# Patient Record
Sex: MALE | Race: WHITE | NOT HISPANIC OR LATINO
[De-identification: names, ages, dates, MRNs, and addresses within clinical notes are randomized per-mention and may not be internally consistent; named-entity substitution may affect disease eponyms.]

---

## 2017-02-13 ENCOUNTER — APPOINTMENT (OUTPATIENT)
Dept: PULMONOLOGY | Facility: CLINIC | Age: 77
End: 2017-02-13

## 2017-02-28 ENCOUNTER — APPOINTMENT (OUTPATIENT)
Dept: VASCULAR SURGERY | Facility: CLINIC | Age: 77
End: 2017-02-28

## 2017-04-19 ENCOUNTER — APPOINTMENT (OUTPATIENT)
Dept: VASCULAR SURGERY | Facility: CLINIC | Age: 77
End: 2017-04-19

## 2017-04-19 RX ORDER — FLUTICASONE PROPIONATE 50 UG/1
50 SPRAY, METERED NASAL
Qty: 16 | Refills: 0 | Status: ACTIVE | COMMUNITY
Start: 2017-04-13

## 2017-05-12 ENCOUNTER — MEDICATION RENEWAL (OUTPATIENT)
Age: 77
End: 2017-05-12

## 2017-05-12 ENCOUNTER — APPOINTMENT (OUTPATIENT)
Dept: PULMONOLOGY | Facility: CLINIC | Age: 77
End: 2017-05-12

## 2017-05-12 VITALS
RESPIRATION RATE: 14 BRPM | HEART RATE: 81 BPM | BODY MASS INDEX: 36.51 KG/M2 | OXYGEN SATURATION: 94 % | HEIGHT: 70 IN | DIASTOLIC BLOOD PRESSURE: 80 MMHG | WEIGHT: 255 LBS | SYSTOLIC BLOOD PRESSURE: 138 MMHG

## 2017-08-23 ENCOUNTER — OUTPATIENT (OUTPATIENT)
Dept: OUTPATIENT SERVICES | Facility: HOSPITAL | Age: 77
LOS: 1 days | End: 2017-08-23
Payer: MEDICARE

## 2017-08-23 ENCOUNTER — APPOINTMENT (OUTPATIENT)
Dept: CT IMAGING | Facility: IMAGING CENTER | Age: 77
End: 2017-08-23
Payer: MEDICARE

## 2017-08-23 DIAGNOSIS — Z00.8 ENCOUNTER FOR OTHER GENERAL EXAMINATION: ICD-10-CM

## 2017-08-23 PROCEDURE — 71250 CT THORAX DX C-: CPT | Mod: 26

## 2017-08-23 PROCEDURE — 71250 CT THORAX DX C-: CPT

## 2017-10-18 ENCOUNTER — APPOINTMENT (OUTPATIENT)
Dept: ELECTROPHYSIOLOGY | Facility: CLINIC | Age: 77
End: 2017-10-18
Payer: MEDICARE

## 2017-10-18 VITALS
DIASTOLIC BLOOD PRESSURE: 98 MMHG | OXYGEN SATURATION: 97 % | BODY MASS INDEX: 37.94 KG/M2 | HEIGHT: 70 IN | WEIGHT: 265 LBS | SYSTOLIC BLOOD PRESSURE: 166 MMHG | HEART RATE: 73 BPM

## 2017-10-18 PROCEDURE — 93000 ELECTROCARDIOGRAM COMPLETE: CPT

## 2017-10-18 PROCEDURE — 99204 OFFICE O/P NEW MOD 45 MIN: CPT

## 2017-10-24 ENCOUNTER — APPOINTMENT (OUTPATIENT)
Dept: VASCULAR SURGERY | Facility: CLINIC | Age: 77
End: 2017-10-24
Payer: MEDICARE

## 2017-10-24 VITALS
TEMPERATURE: 99.6 F | HEIGHT: 70 IN | BODY MASS INDEX: 37.22 KG/M2 | WEIGHT: 260 LBS | DIASTOLIC BLOOD PRESSURE: 90 MMHG | SYSTOLIC BLOOD PRESSURE: 136 MMHG | HEART RATE: 75 BPM

## 2017-10-24 DIAGNOSIS — I71.4 ABDOMINAL AORTIC ANEURYSM, W/OUT RUPTURE: ICD-10-CM

## 2017-10-24 PROCEDURE — 93926 LOWER EXTREMITY STUDY: CPT | Mod: RT

## 2017-10-24 PROCEDURE — 99213 OFFICE O/P EST LOW 20 MIN: CPT | Mod: 25

## 2017-10-24 PROCEDURE — 93978 VASCULAR STUDY: CPT

## 2017-11-04 ENCOUNTER — EMERGENCY (EMERGENCY)
Facility: HOSPITAL | Age: 77
LOS: 1 days | End: 2017-11-04
Attending: EMERGENCY MEDICINE | Admitting: EMERGENCY MEDICINE
Payer: MEDICARE

## 2017-11-04 VITALS
OXYGEN SATURATION: 97 % | WEIGHT: 259.93 LBS | HEART RATE: 82 BPM | TEMPERATURE: 99 F | RESPIRATION RATE: 18 BRPM | SYSTOLIC BLOOD PRESSURE: 215 MMHG | DIASTOLIC BLOOD PRESSURE: 86 MMHG

## 2017-11-04 LAB
ALBUMIN SERPL ELPH-MCNC: 4.7 G/DL — SIGNIFICANT CHANGE UP (ref 3.3–5)
ALP SERPL-CCNC: 54 U/L — SIGNIFICANT CHANGE UP (ref 40–120)
ALT FLD-CCNC: 46 U/L RC — HIGH (ref 10–45)
ANION GAP SERPL CALC-SCNC: 16 MMOL/L — SIGNIFICANT CHANGE UP (ref 5–17)
APTT BLD: 31.8 SEC — SIGNIFICANT CHANGE UP (ref 27.5–37.4)
AST SERPL-CCNC: 25 U/L — SIGNIFICANT CHANGE UP (ref 10–40)
BASOPHILS # BLD AUTO: 0.1 K/UL — SIGNIFICANT CHANGE UP (ref 0–0.2)
BASOPHILS NFR BLD AUTO: 0.8 % — SIGNIFICANT CHANGE UP (ref 0–2)
BILIRUB SERPL-MCNC: 0.4 MG/DL — SIGNIFICANT CHANGE UP (ref 0.2–1.2)
BLD GP AB SCN SERPL QL: NEGATIVE — SIGNIFICANT CHANGE UP
BUN SERPL-MCNC: 19 MG/DL — SIGNIFICANT CHANGE UP (ref 7–23)
CALCIUM SERPL-MCNC: 10.5 MG/DL — SIGNIFICANT CHANGE UP (ref 8.4–10.5)
CHLORIDE SERPL-SCNC: 99 MMOL/L — SIGNIFICANT CHANGE UP (ref 96–108)
CK MB BLD-MCNC: 4.1 % — HIGH (ref 0–3.5)
CK MB CFR SERPL CALC: 7.7 NG/ML — HIGH (ref 0–6.7)
CK SERPL-CCNC: 190 U/L — SIGNIFICANT CHANGE UP (ref 30–200)
CO2 SERPL-SCNC: 26 MMOL/L — SIGNIFICANT CHANGE UP (ref 22–31)
CREAT SERPL-MCNC: 1.12 MG/DL — SIGNIFICANT CHANGE UP (ref 0.5–1.3)
EOSINOPHIL # BLD AUTO: 0.2 K/UL — SIGNIFICANT CHANGE UP (ref 0–0.5)
EOSINOPHIL NFR BLD AUTO: 2.9 % — SIGNIFICANT CHANGE UP (ref 0–6)
GLUCOSE SERPL-MCNC: 146 MG/DL — HIGH (ref 70–99)
HCT VFR BLD CALC: 48.4 % — SIGNIFICANT CHANGE UP (ref 39–50)
HGB BLD-MCNC: 16.3 G/DL — SIGNIFICANT CHANGE UP (ref 13–17)
INR BLD: 0.98 RATIO — SIGNIFICANT CHANGE UP (ref 0.88–1.16)
LYMPHOCYTES # BLD AUTO: 2.5 K/UL — SIGNIFICANT CHANGE UP (ref 1–3.3)
LYMPHOCYTES # BLD AUTO: 30.5 % — SIGNIFICANT CHANGE UP (ref 13–44)
MCHC RBC-ENTMCNC: 29.1 PG — SIGNIFICANT CHANGE UP (ref 27–34)
MCHC RBC-ENTMCNC: 33.6 GM/DL — SIGNIFICANT CHANGE UP (ref 32–36)
MCV RBC AUTO: 86.5 FL — SIGNIFICANT CHANGE UP (ref 80–100)
MONOCYTES # BLD AUTO: 0.5 K/UL — SIGNIFICANT CHANGE UP (ref 0–0.9)
MONOCYTES NFR BLD AUTO: 6.4 % — SIGNIFICANT CHANGE UP (ref 2–14)
NEUTROPHILS # BLD AUTO: 4.8 K/UL — SIGNIFICANT CHANGE UP (ref 1.8–7.4)
NEUTROPHILS NFR BLD AUTO: 59.3 % — SIGNIFICANT CHANGE UP (ref 43–77)
PLATELET # BLD AUTO: 158 K/UL — SIGNIFICANT CHANGE UP (ref 150–400)
POTASSIUM SERPL-MCNC: 4.4 MMOL/L — SIGNIFICANT CHANGE UP (ref 3.5–5.3)
POTASSIUM SERPL-SCNC: 4.4 MMOL/L — SIGNIFICANT CHANGE UP (ref 3.5–5.3)
PROT SERPL-MCNC: 7.9 G/DL — SIGNIFICANT CHANGE UP (ref 6–8.3)
PROTHROM AB SERPL-ACNC: 10.7 SEC — SIGNIFICANT CHANGE UP (ref 9.8–12.7)
RBC # BLD: 5.6 M/UL — SIGNIFICANT CHANGE UP (ref 4.2–5.8)
RBC # FLD: 15.1 % — HIGH (ref 10.3–14.5)
RH IG SCN BLD-IMP: POSITIVE — SIGNIFICANT CHANGE UP
SODIUM SERPL-SCNC: 141 MMOL/L — SIGNIFICANT CHANGE UP (ref 135–145)
TROPONIN T SERPL-MCNC: 0.03 NG/ML — SIGNIFICANT CHANGE UP (ref 0–0.06)
WBC # BLD: 8.1 K/UL — SIGNIFICANT CHANGE UP (ref 3.8–10.5)
WBC # FLD AUTO: 8.1 K/UL — SIGNIFICANT CHANGE UP (ref 3.8–10.5)

## 2017-11-04 PROCEDURE — 71010: CPT | Mod: 26

## 2017-11-04 PROCEDURE — 93010 ELECTROCARDIOGRAM REPORT: CPT

## 2017-11-04 PROCEDURE — 70450 CT HEAD/BRAIN W/O DYE: CPT | Mod: 26

## 2017-11-04 PROCEDURE — 99220: CPT | Mod: 25

## 2017-11-04 RX ORDER — ASPIRIN/CALCIUM CARB/MAGNESIUM 324 MG
81 TABLET ORAL DAILY
Qty: 0 | Refills: 0 | Status: DISCONTINUED | OUTPATIENT
Start: 2017-11-04 | End: 2017-11-11

## 2017-11-04 RX ORDER — ACYCLOVIR SODIUM 500 MG
800 VIAL (EA) INTRAVENOUS ONCE
Qty: 0 | Refills: 0 | Status: COMPLETED | OUTPATIENT
Start: 2017-11-04 | End: 2017-11-04

## 2017-11-04 RX ORDER — ATORVASTATIN CALCIUM 80 MG/1
80 TABLET, FILM COATED ORAL AT BEDTIME
Qty: 0 | Refills: 0 | Status: DISCONTINUED | OUTPATIENT
Start: 2017-11-04 | End: 2017-11-11

## 2017-11-04 RX ORDER — FOLIC ACID 0.8 MG
1 TABLET ORAL DAILY
Qty: 0 | Refills: 0 | Status: DISCONTINUED | OUTPATIENT
Start: 2017-11-04 | End: 2017-11-11

## 2017-11-04 RX ORDER — CILOSTAZOL 100 MG/1
50 TABLET ORAL
Qty: 0 | Refills: 0 | Status: DISCONTINUED | OUTPATIENT
Start: 2017-11-04 | End: 2017-11-11

## 2017-11-04 RX ORDER — SODIUM CHLORIDE 9 MG/ML
3 INJECTION INTRAMUSCULAR; INTRAVENOUS; SUBCUTANEOUS EVERY 8 HOURS
Qty: 0 | Refills: 0 | Status: DISCONTINUED | OUTPATIENT
Start: 2017-11-04 | End: 2017-11-11

## 2017-11-04 RX ORDER — CLOPIDOGREL BISULFATE 75 MG/1
75 TABLET, FILM COATED ORAL DAILY
Qty: 0 | Refills: 0 | Status: DISCONTINUED | OUTPATIENT
Start: 2017-11-04 | End: 2017-11-11

## 2017-11-04 RX ORDER — ASPIRIN/CALCIUM CARB/MAGNESIUM 324 MG
81 TABLET ORAL ONCE
Qty: 0 | Refills: 0 | Status: DISCONTINUED | OUTPATIENT
Start: 2017-11-04 | End: 2017-11-04

## 2017-11-04 RX ORDER — SODIUM CHLORIDE 0.65 %
1 AEROSOL, SPRAY (ML) NASAL EVERY 6 HOURS
Qty: 0 | Refills: 0 | Status: DISCONTINUED | OUTPATIENT
Start: 2017-11-04 | End: 2017-11-11

## 2017-11-04 RX ORDER — METOPROLOL TARTRATE 50 MG
50 TABLET ORAL
Qty: 0 | Refills: 0 | Status: DISCONTINUED | OUTPATIENT
Start: 2017-11-04 | End: 2017-11-11

## 2017-11-04 RX ADMIN — Medication 800 MILLIGRAM(S): at 20:31

## 2017-11-04 NOTE — ED CDU PROVIDER DISPOSITION NOTE - CLINICAL COURSE
78y/o male with past medical history HTN, hyperlipidemia, Coronary Artery Disease with stents and CABG x3, COPD, HLD, PVD, Bell's Palsy in the past, presents to the ED for slurred speech and facial droop since 1pm. Pt was noted to have slurred speech and right sided facial droop by family. Pt and family state symptoms are slightly improved from before. Pt states he had the same symptoms last time he had bells palsy 4 years ago. Patient reports no ear pain, no change in taste, no change in hearing. He denied weakness, numbness, vision loss and difficulty with coordination. pt refused a/c and prednisone in ED, received acyclovir in ED. CT head negative, labs WNL, MRI brain __________. 78y/o male with past medical history HTN, hyperlipidemia, Coronary Artery Disease with stents and CABG x3, COPD, HLD, PVD, Bell's Palsy in the past, presents to the ED for slurred speech and facial droop since 1pm. Pt was noted to have slurred speech and right sided facial droop by family. Pt and family state symptoms are slightly improved from before. Pt states he had the same symptoms last time he had bells palsy 4 years ago. Patient reports no ear pain, no change in taste, no change in hearing. He denied weakness, numbness, vision loss and difficulty with coordination. pt refused a/c and prednisone in ED, received acyclovir in ED. CT head negative, labs WNL. Patient seen by Dr. Carballo, neurologist this morning, stating that he feels strongly patient has Bell's Palsy. Patient refused MRI; unable to tolerate despite offering medication. Dr. Carballo made aware and ok with it as patient will see him tomorrow in his office. Pt had 5 beats of VTach and Dr. Carballo and Dr. Doan made aware; pt's cardiologist Dr. Graff's answering service called. Pt had work-up for arrhythmia last week and continues to follow-up. Will see Dr. Carballo tomorrow, Dr. Graff this week, d/c with treatment for Bell's Palsy.

## 2017-11-04 NOTE — ED CDU PROVIDER INITIAL DAY NOTE - OBJECTIVE STATEMENT
76y/o male with past medical history HTN, hyperlipidemia, Coronary Artery Disease with stents and CABG x3, COPD, HLD, PVD, Bell's Palsy in the past, presents to the ED for slurred speech and facial droop since 1pm. Pt was noted to have slurred speech and right sided facial droop by family. Pt and family state symptoms are slightly improved from before. Pt states he had the same symptoms last time he had bells palsy 4 years ago. Patient reports no ear pain, no change in taste, no change in hearing. He denied weakness, numbness, vision loss and difficulty with coordination. pt refused a/c and prednisone in ED, received acyclovir in ED.

## 2017-11-04 NOTE — CONSULT NOTE ADULT - SUBJECTIVE AND OBJECTIVE BOX
HPI:    77 year old male past medical history HTN, hyperlipidemia, Coronary Artery Disease with stents and CABG x3, Bell's Palsy in the past, presents to the ED for slurred speech and facial droop.     Last know normal was between 12-1 pm,  when patient arrived to family member's house in [place]. He was noted to have slurred speech and right sided facial droop. Patient reports no ear pain, no change in taste, no change in hearing.     He denied weakness, numbness, vision loss and difficulty with coordination.     MEDICATIONS  (STANDING):  aspirin enteric coated 81 milliGRAM(s) Oral daily    MEDICATIONS  (PRN):    PAST MEDICAL & SURGICAL HISTORY:  COPD (chronic obstructive pulmonary disease)  PVD (Peripheral Vascular Disease)  Obese  Right Ankle Fracture  Hypercholesterolemia  HTN - Hypertension  Wound debridement 2010  Right Great saphenous vein bypass: trombectomy  ORIF Right ankle      FAMILY HISTORY:  Family history of hypertension  Family history of diabetes mellitus  Family history of heart disease: MI -  at 57      Allergies    No Known Allergies    Intolerances      SHx - No smoking, No ETOH, No drug abuse      Review of Systems:  CONSTITUTIONAL:  No weight loss, fever, chills, weakness or fatigue.  HEENT:  Eyes:  No visual loss, blurred vision, double vision or yellow sclerae. Ears, Nose, Throat:  No hearing loss, sneezing, congestion, runny nose or sore throat.  SKIN:  No rash or itching.  CARDIOVASCULAR:  No chest pain, chest pressure or chest discomfort. No palpitations or edema.  RESPIRATORY:  No shortness of breath, cough or sputum.  GASTROINTESTINAL:  No anorexia, nausea, vomiting or diarrhea. No abdominal pain or blood.  GENITOURINARY:  NO Burning on urination.   NEUROLOGICAL: See HPI  MUSCULOSKELETAL:  No muscle, back pain, joint pain or stiffness.  HEMATOLOGIC:  No anemia, bleeding or bruising.  LYMPHATICS:  No enlarged nodes. No history of splenectomy.  PSYCHIATRIC:  No history of depression or anxiety.  ENDOCRINOLOGIC:  No reports of sweating, cold or heat intolerance. No polyuria or polydipsia.  ALLERGIES:  No history of asthma, hives, eczema or rhinitis.        Vital Signs Last 24 Hrs  T(C): 37.1 (2017 19:20), Max: 37.2 (2017 19:12)  T(F): 98.7 (2017 19:20), Max: 98.9 (2017 19:12)  HR: 79 (2017 20:32) (79 - 90)  BP: 159/91 (2017 20:32) (159/91 - 215/86)  BP(mean): --  RR: 18 (2017 20:32) (18 - 20)  SpO2: 98% (2017 20:32) (97% - 100%)    General Exam:   General appearance: No acute distress                   Neurological Exam:    Mental Status: Orientated to self, date and place.  Attention intact.  No dysarthria, aphasia or neglect.  Cranial Nerves: PERRL, EOMI, CN V1-3 intact to light touch and pinprick. facial droop on right, less wrinkles seen on right forehead comapred to left and patient's taste altered on left side of tongue  , Tongue, uvula and palate midline.      Motor:   Tone: normal.                  Strength:     Upper extremity           Lower extremity                       HF          KE          KF        DF         PF                                               R        5/5        5/5        5/5       5/5       5/5                                               L         5/5        5/5       5/5       5/5        5/5  Pronator drift: none                 Dysmetria: None to finger-nose-finger or heel-shin-heel  No truncal ataxia.    Tremor: No resting, postural or action tremor.  No myoclonus.    Sensation: intact to light touch, pinprick    Deep Tendon Reflexes: 1+ bilateral biceps, triceps, brachioradialis, knee and ankle  Toes flexor bilaterally    Gait: normal and stable.      Other:        141  |  99  |  19  ----------------------------<  146<H>  4.4   |  26  |  1.12    Ca    10.5      2017 19:26    TPro  7.9  /  Alb  4.7  /  TBili  0.4  /  DBili  x   /  AST  25  /  ALT  46<H>  /  AlkPhos  54      141  |  99  |  19  ----------------------------<  146<H>  4.4   |  26  |  1.12    Ca    10.5      2017 19:26    TPro  7.9  /  Alb  4.7  /  TBili  0.4  /  DBili  x   /  AST  25  /  ALT  46<H>  /  AlkPhos  54  11-04                          16.3   8.1   )-----------( 158      ( 2017 19:26 )             48.4       Radiology    CT head : unremarkable

## 2017-11-04 NOTE — ED ADULT NURSE REASSESSMENT NOTE - COMFORT CARE
warm blanket provided/repositioned/wait time explained/plan of care explained/treatment delay explained

## 2017-11-04 NOTE — ED ADULT NURSE NOTE - PMH
COPD (chronic obstructive pulmonary disease)    HTN - Hypertension    Hypercholesterolemia    Obese    PVD (Peripheral Vascular Disease)    Right Ankle Fracture

## 2017-11-04 NOTE — ED CDU PROVIDER INITIAL DAY NOTE - MEDICAL DECISION MAKING DETAILS
77M hx multiple comorbidities presents with right sided facial droop suggestive of CVA v atypical bell's palsy.  CDU for MRI brain as per neurology recommendation, q4h neuro checks, telemonitoring.  If MRI negative continue course of prednisone and valtrex for suspected bell's palsy.

## 2017-11-04 NOTE — ED PROVIDER NOTE - ATTENDING CONTRIBUTION TO CARE
***Attending Jensen Hansen MD***  77M pmhx HTN, HLD, CAD, bell's palsy presents ***Attending Jensen Hansen MD***  77M pmhx HTN, HLD, CAD, bell's palsy presents with slurred speech and right-sided facial droop noted approx 6 hours prior to ED arrival.  No known trauma.  No other complaints.  Pt ambulatory.  Drove to [place] earlier today.  On ASA 81mg.  VSS.  Right facial droop with some involvement of R lids, sparing of forehead.  code stroke called.  CVA v atypical Bell's palsy

## 2017-11-04 NOTE — ED ADULT NURSE REASSESSMENT NOTE - NIH STROKE SCALE: 4. FACIAL PALSY, QM
(1) Minor paralysis (flattened nasolabial fold, asymmetry on smiling)
(1) Minor paralysis (flattened nasolabial fold, asymmetry on smiling)

## 2017-11-04 NOTE — ED ADULT NURSE NOTE - OBJECTIVE STATEMENT
78 yo male pt with history of HTN, HLD, CAD with stents and cabg x 3, bell's palsy in the past presents to ed complaining of slurred speech. as per pt's family pt started having slurred speech and right sided facial droop today at 1:00PM. pt denies headache/pain of any kind/diaphoresis/chest pain/n/v/numbness/tingling/blurry vision/weakness besides face. pupils equal round and reactive to light. raphael with 5/5 strength. no motor or sensory loss noted. pt states "my face just feels weak". CODE STROKE CALLED AT 1916. right sided facial droop noted. no leg or arm drift noted. aox4, follows commands. breath sounds clear and equal bilaterally. skin warm dry and intact. pt denies falls/trauma/hitting head. safety maintained, family at bedside.

## 2017-11-04 NOTE — ED CDU PROVIDER INITIAL DAY NOTE - DETAILS
- frequent re-eval  - vitals q 4hrs  - tele  - neurochecks q 4hrs  - MRI head  - neuro following  - DC with valtrex 1gm TID for bells palsy   - case discussed with attending Dr. Hansen

## 2017-11-04 NOTE — ED ADULT NURSE NOTE - CHPI ED SYMPTOMS NEG
no loss of consciousness/no nausea/no vomiting/no numbness/no change in level of consciousness/no fever/no weakness/no blurred vision/no dizziness/no confusion

## 2017-11-04 NOTE — ED PROVIDER NOTE - MEDICAL DECISION MAKING DETAILS
77 year old male past medical history HTN, hyperlipidemia, Coronary Artery Disease with stents and CABG x3, Bell's Palsy in the past, presents to the ED for slurred speech. Code stroke called from triage. However likely Bell's palsy as he has some right upper facial movement loss. However, will continue with code stroke. Patient outside TPA window. Neurology evaluated patient at CT scan. Will obtain code stroke labs, CT, neuro recs, reeval.

## 2017-11-04 NOTE — CONSULT NOTE ADULT - ASSESSMENT
77 year old male past medical history HTN, hyperlipidemia, Coronary Artery Disease with stents and CABG x3, Bell's Palsy in the past, presents to the ED for slurred speech and facial droop. Last know normal was between 12-1 pm,  when patient arrived to family member's house in [place]. He was noted to have slurred speech and right sided facial droop. Patient reports no ear pain, no change in taste, no change in hearing. He denied weakness, numbness, vision loss and difficulty with coordination. Examination was concerning for right facial droop with less wrinkles on right forehead compare to left but on taste evaluation he had opposite result.     Impression    bells palsy vs stroke     Plan     Aspirin 81 mg   Atorvastatin 80 mg   Prednisone 50 mg 77 year old male past medical history HTN, hyperlipidemia, Coronary Artery Disease with stents and CABG x3, Bell's Palsy in the past, presents to the ED for slurred speech and facial droop. Last know normal was between 12-1 pm,  when patient arrived to family member's house in [place]. He was noted to have slurred speech and right sided facial droop. Patient reports no ear pain, no change in taste, no change in hearing. He denied weakness, numbness, vision loss and difficulty with coordination. Examination was concerning for right facial droop with less wrinkles on right forehead compare to left but on taste evaluation he had opposite result.     Impression    bells palsy vs stroke     Plan     Aspirin 81 mg   Atorvastatin 80 mg   Prednisone 50 mg     If MRI is negative for stroke, patient can get prednisone 50 mg for 5 days

## 2017-11-04 NOTE — ED CDU PROVIDER DISPOSITION NOTE - ATTENDING CONTRIBUTION TO CARE
CDU Attending Note -- Pt seen and examined at bedside.  Case discussed c CDU PA.  Pt comfortable, physical exam findings stable, has f/u c neurology tomorrow.  Aware of 5 beat NSVT, pt states he had w/u for same several weeks ago and found to be benign.  Strict return precautions provided, prednisone Rx'd to patient's pharmacy.  Stable for d/c.  --BMM

## 2017-11-04 NOTE — ED ADULT NURSE REASSESSMENT NOTE - NS ED NURSE REASSESS COMMENT FT1
neurological assessment and vital signs sheet filled out and placed in chart, see sheet for neuro checks. safety maintained. pt resting comfortably in bed. pt denies chest pain/headache/n/v/numbness/tingling. raphael with 5/5 strength. no motor or sensory loss noted. family at bedside. pa from cdu at bedside.
pt resting comfortably in bed. aox4, follows commands. pt denies numbness/tingling/n/v/diaphoresis/chest pain/sob/fever/chills/abd pain/headache/dizziness/weakness at this time. safety maintained. will continue to monitor.
Pt report received from Nasrin MORALES. Pt transferred to cdu for right sided facial droop. Pt a/ox3 denies respiratory distress, sob, dyspnea, C/P, palpitations, n/v/d at this time. Pt neuro intact, pt states no changes in vision, dizziness, or headache. Awaiting MRI . VSS, afebrile, IV clean/dry/intact. Pt aware of plan of care, call bell within reach ,safety maintained. Will monitor and assess.

## 2017-11-04 NOTE — ED CDU PROVIDER DISPOSITION NOTE - PLAN OF CARE
1. Continue your home medications as directed. Take Valtrex 1gm 3 times a day.   2. Drink plenty of fluids to stay hydrated.  3. You will need to follow up with your PMD and neurologist or our neurology clinic at [phone] in 2-3 days. A copy of your results were given to you to bring to your appt.   4. Return to ER for headache, confusion, behavior/speech changes, numbness/tingling/weakness in your arms/legs, or any other concerns. 1. Continue your home medications as directed. Take Valtrex 1gm 3 times a day. Take Prednisone 60mg once daily for 7 days.  2. Drink plenty of fluids to stay hydrated.  3. You will need to follow up with your PMD/cardiologist Dr. Graff this week for reevaluation. You need to call Dr. Carballo tomorrow morning to establish care with him and see him in his office tomorrow at 1pm. #[phone].   4. Return to ER for headache, confusion, behavior/speech changes, numbness/tingling/weakness in your arms/legs, or any other concerns.

## 2017-11-04 NOTE — ED PROVIDER NOTE - FAMILY HISTORY
Father  Still living? Unknown  Family history of heart disease, Age at diagnosis: Age Unknown  Family history of hypertension, Age at diagnosis: Age Unknown     Mother  Still living? Unknown  Family history of hypertension, Age at diagnosis: Age Unknown

## 2017-11-04 NOTE — CONSULT NOTE ADULT - ATTENDING COMMENTS
The patient has been seen and evaluated.  The patient's exam reveals a LMN facial droop on the right.  There is no evidence of dysconjugate gaze, tongue deviation, sensory change, pupillary abnormalities, weakness or paresthesias.  This likely represents a right Bell's Palsy, however given that this  is the patient's second episode I would recommend serology for sarcoidosis and lyme disease (ACE/Lyme Ig Index).  The patient was scheduled for MRI Brain prior to discharge however is refusing the examination.  I would recommend that the patient be treated with Prednisone 60mg Q24h for 10 days for right peripheral facial nerve dysfunction.  Close follow up in my office and if there is any worsening of symptoms he should return to the ED as soon as possible. C/W ASA and Statin

## 2017-11-05 VITALS
TEMPERATURE: 98 F | DIASTOLIC BLOOD PRESSURE: 84 MMHG | RESPIRATION RATE: 16 BRPM | HEART RATE: 85 BPM | OXYGEN SATURATION: 95 % | SYSTOLIC BLOOD PRESSURE: 154 MMHG

## 2017-11-05 LAB
CHOLEST SERPL-MCNC: 135 MG/DL — SIGNIFICANT CHANGE UP (ref 10–199)
HBA1C BLD-MCNC: 6.2 % — HIGH (ref 4–5.6)
HDLC SERPL-MCNC: 24 MG/DL — LOW (ref 40–125)
LIPID PNL WITH DIRECT LDL SERPL: SIGNIFICANT CHANGE UP
TOTAL CHOLESTEROL/HDL RATIO MEASUREMENT: 5.6 RATIO — SIGNIFICANT CHANGE UP (ref 3.4–9.6)
TRIGL SERPL-MCNC: 419 MG/DL — HIGH (ref 10–149)

## 2017-11-05 PROCEDURE — 85610 PROTHROMBIN TIME: CPT

## 2017-11-05 PROCEDURE — 71045 X-RAY EXAM CHEST 1 VIEW: CPT

## 2017-11-05 PROCEDURE — 86850 RBC ANTIBODY SCREEN: CPT

## 2017-11-05 PROCEDURE — 86900 BLOOD TYPING SEROLOGIC ABO: CPT

## 2017-11-05 PROCEDURE — 83036 HEMOGLOBIN GLYCOSYLATED A1C: CPT

## 2017-11-05 PROCEDURE — 99217: CPT | Mod: 25

## 2017-11-05 PROCEDURE — G0378: CPT

## 2017-11-05 PROCEDURE — 85730 THROMBOPLASTIN TIME PARTIAL: CPT

## 2017-11-05 PROCEDURE — 82962 GLUCOSE BLOOD TEST: CPT

## 2017-11-05 PROCEDURE — 80061 LIPID PANEL: CPT

## 2017-11-05 PROCEDURE — 70450 CT HEAD/BRAIN W/O DYE: CPT

## 2017-11-05 PROCEDURE — 86901 BLOOD TYPING SEROLOGIC RH(D): CPT

## 2017-11-05 PROCEDURE — 93005 ELECTROCARDIOGRAM TRACING: CPT

## 2017-11-05 PROCEDURE — 82553 CREATINE MB FRACTION: CPT

## 2017-11-05 PROCEDURE — 82550 ASSAY OF CK (CPK): CPT

## 2017-11-05 PROCEDURE — 80053 COMPREHEN METABOLIC PANEL: CPT

## 2017-11-05 PROCEDURE — 85027 COMPLETE CBC AUTOMATED: CPT

## 2017-11-05 PROCEDURE — 99284 EMERGENCY DEPT VISIT MOD MDM: CPT | Mod: 25

## 2017-11-05 PROCEDURE — 84484 ASSAY OF TROPONIN QUANT: CPT

## 2017-11-05 RX ORDER — VALACYCLOVIR 500 MG/1
1 TABLET, FILM COATED ORAL
Qty: 21 | Refills: 0
Start: 2017-11-05 | End: 2017-11-12

## 2017-11-05 RX ADMIN — CLOPIDOGREL BISULFATE 75 MILLIGRAM(S): 75 TABLET, FILM COATED ORAL at 09:18

## 2017-11-05 RX ADMIN — Medication 81 MILLIGRAM(S): at 09:18

## 2017-11-05 RX ADMIN — ATORVASTATIN CALCIUM 80 MILLIGRAM(S): 80 TABLET, FILM COATED ORAL at 00:07

## 2017-11-05 RX ADMIN — Medication 50 MILLIGRAM(S): at 00:07

## 2017-11-05 RX ADMIN — SODIUM CHLORIDE 3 MILLILITER(S): 9 INJECTION INTRAMUSCULAR; INTRAVENOUS; SUBCUTANEOUS at 06:33

## 2017-11-05 RX ADMIN — Medication 1 SPRAY(S): at 00:08

## 2017-11-05 RX ADMIN — CILOSTAZOL 50 MILLIGRAM(S): 100 TABLET ORAL at 06:52

## 2017-11-05 RX ADMIN — Medication 50 MILLIGRAM(S): at 09:18

## 2017-11-05 NOTE — ED CDU PROVIDER SUBSEQUENT DAY NOTE - PROGRESS NOTE DETAILS
CDU progress note YOLA Logan: Patient sleeping comfortably. NAD. VSS. no events on telemetry. CDU PROGRESS NOTE YOLA AZUL: Pt resting comfortably, feeling well without complaint. NAD, VSS. No events on telemetry. Dr. Carballo, neurologist, came to bedside to see and evaluate patient. Pending MRI at this time. Tele room called to say pt had 5 beats of Vtach. Pt was asymptomatic throughout this episode and denies any chest pain, lightheadedness, dizziness or other symptoms. VSS. Will discuss plan with Dr. Carballo and patient's PMD Dr. Graff when results are in. Pt refused MRI. Multiple attempts to discuss with patient the importance of having MRI to r/o stroke, offered patient medication to help make MRI tolerable including benzo for sedative, patient continues to adamantly refuse. Called Dr. Carballo to discuss stating that he feels strongly patient has a Bell's Palsy given CN VI palsy, LMN facial droop and no other discrete neurological deficits. Dr. Carballo states that he has very low suspicion this is ischemic, and is OK with patient discharged without MRI. States that he agrees with sending patient home with treatment for Bell's Palsy and will see patient tomorrow morning in his office. Attempt to contact patient's PMD Dr. Graff regarding run of PVCs, answering service said 'they spoke to him and he is not taking call until 11AM unable to discuss the patient at this time.' Advised service to please inform Dr. Graff of these results and that patient is refusing further testing asked them to call me when he is on call. As patient is refusing telemetry at this time and wishing to go home, prednisone and valtrex sent to patient's pharmacy and pending Dr. Doan evaluation for patient dispo. Attending Note -- Agree with above.  Please see ED provider/CDU discharge MDM as indicated.  --BMM

## 2017-11-05 NOTE — ED CDU PROVIDER SUBSEQUENT DAY NOTE - HISTORY
No interval changes since initial CDU provider note, pt waiting for MRI. Pt feels well without complaint. NAD VSS. no events on tele. Neuro exam: R facial droop slightly improved - visible lines on R side of forehead now with slight eyebrow movement, otherwise exam unchanged. - YOLA Logan

## 2017-11-06 NOTE — ED POST DISCHARGE NOTE - OTHER COMMUNICATION
spoke to pt in detail in regards to the lab work and change in diet, exercise and weight loss and follow up with PMD for repeat bloodwork. pt understands and will follow up -YOLA Gottlieb

## 2017-11-10 ENCOUNTER — APPOINTMENT (OUTPATIENT)
Dept: PULMONOLOGY | Facility: CLINIC | Age: 77
End: 2017-11-10

## 2018-01-16 ENCOUNTER — APPOINTMENT (OUTPATIENT)
Dept: VASCULAR SURGERY | Facility: CLINIC | Age: 78
End: 2018-01-16

## 2018-01-17 ENCOUNTER — APPOINTMENT (OUTPATIENT)
Dept: VASCULAR SURGERY | Facility: CLINIC | Age: 78
End: 2018-01-17

## 2018-02-28 ENCOUNTER — APPOINTMENT (OUTPATIENT)
Dept: VASCULAR SURGERY | Facility: CLINIC | Age: 78
End: 2018-02-28
Payer: MEDICARE

## 2018-02-28 VITALS
BODY MASS INDEX: 37.22 KG/M2 | HEIGHT: 70 IN | WEIGHT: 260 LBS | SYSTOLIC BLOOD PRESSURE: 148 MMHG | DIASTOLIC BLOOD PRESSURE: 79 MMHG | TEMPERATURE: 98.4 F | HEART RATE: 71 BPM

## 2018-02-28 PROCEDURE — 99214 OFFICE O/P EST MOD 30 MIN: CPT

## 2018-02-28 PROCEDURE — 93880 EXTRACRANIAL BILAT STUDY: CPT

## 2018-02-28 RX ORDER — VALACYCLOVIR 1 G/1
1 TABLET, FILM COATED ORAL
Qty: 21 | Refills: 0 | Status: ACTIVE | COMMUNITY
Start: 2017-11-05

## 2018-02-28 RX ORDER — ECONAZOLE NITRATE 10 MG/G
1 CREAM TOPICAL
Qty: 85 | Refills: 0 | Status: ACTIVE | COMMUNITY
Start: 2017-11-10

## 2018-04-09 NOTE — ED PROVIDER NOTE - PROGRESS NOTE DETAILS
Card mailed.  
Seen by Neuro unclear if Bell's or stroke, on their exam had ?glossopharyngeal dysfunction, will treat for bells and dispo to cdu for MRI

## 2018-05-25 ENCOUNTER — APPOINTMENT (OUTPATIENT)
Dept: VASCULAR SURGERY | Facility: CLINIC | Age: 78
End: 2018-05-25
Payer: MEDICARE

## 2018-05-25 VITALS
SYSTOLIC BLOOD PRESSURE: 138 MMHG | HEIGHT: 70 IN | TEMPERATURE: 98.3 F | WEIGHT: 260 LBS | HEART RATE: 75 BPM | DIASTOLIC BLOOD PRESSURE: 72 MMHG | BODY MASS INDEX: 37.22 KG/M2

## 2018-05-25 PROCEDURE — 99214 OFFICE O/P EST MOD 30 MIN: CPT

## 2018-05-25 PROCEDURE — 93926 LOWER EXTREMITY STUDY: CPT

## 2018-05-25 PROCEDURE — 93923 UPR/LXTR ART STDY 3+ LVLS: CPT

## 2018-05-25 RX ORDER — IBUPROFEN 600 MG/1
600 TABLET, FILM COATED ORAL
Qty: 15 | Refills: 0 | Status: ACTIVE | COMMUNITY
Start: 2018-05-21

## 2018-06-27 ENCOUNTER — APPOINTMENT (OUTPATIENT)
Dept: VASCULAR SURGERY | Facility: CLINIC | Age: 78
End: 2018-06-27
Payer: MEDICARE

## 2018-06-27 VITALS
HEART RATE: 74 BPM | DIASTOLIC BLOOD PRESSURE: 80 MMHG | TEMPERATURE: 98.3 F | WEIGHT: 260 LBS | BODY MASS INDEX: 37.22 KG/M2 | HEIGHT: 70 IN | SYSTOLIC BLOOD PRESSURE: 129 MMHG

## 2018-06-27 DIAGNOSIS — I77.811 ABDOMINAL AORTIC ECTASIA: ICD-10-CM

## 2018-06-27 PROCEDURE — 93978 VASCULAR STUDY: CPT

## 2018-06-27 PROCEDURE — 99213 OFFICE O/P EST LOW 20 MIN: CPT

## 2018-06-27 RX ORDER — SODIUM FLUORIDE 1.1 G/100G
1.1 GEL, DENTIFRICE ORAL
Qty: 51 | Refills: 0 | Status: ACTIVE | COMMUNITY
Start: 2018-06-06

## 2018-09-24 ENCOUNTER — RX RENEWAL (OUTPATIENT)
Age: 78
End: 2018-09-24

## 2018-10-08 ENCOUNTER — OUTPATIENT (OUTPATIENT)
Dept: OUTPATIENT SERVICES | Facility: HOSPITAL | Age: 78
LOS: 1 days | End: 2018-10-08
Payer: MEDICARE

## 2018-10-08 ENCOUNTER — APPOINTMENT (OUTPATIENT)
Dept: RADIOLOGY | Facility: IMAGING CENTER | Age: 78
End: 2018-10-08
Payer: MEDICARE

## 2018-10-08 DIAGNOSIS — Z00.8 ENCOUNTER FOR OTHER GENERAL EXAMINATION: ICD-10-CM

## 2018-10-08 PROCEDURE — 71046 X-RAY EXAM CHEST 2 VIEWS: CPT

## 2018-10-08 PROCEDURE — 71046 X-RAY EXAM CHEST 2 VIEWS: CPT | Mod: 26

## 2018-10-22 ENCOUNTER — RX RENEWAL (OUTPATIENT)
Age: 78
End: 2018-10-22

## 2018-10-24 ENCOUNTER — APPOINTMENT (OUTPATIENT)
Dept: VASCULAR SURGERY | Facility: CLINIC | Age: 78
End: 2018-10-24

## 2018-12-19 ENCOUNTER — APPOINTMENT (OUTPATIENT)
Dept: VASCULAR SURGERY | Facility: CLINIC | Age: 78
End: 2018-12-19

## 2019-05-28 ENCOUNTER — APPOINTMENT (OUTPATIENT)
Dept: VASCULAR SURGERY | Facility: CLINIC | Age: 79
End: 2019-05-28
Payer: MEDICARE

## 2019-05-28 VITALS
BODY MASS INDEX: 37.94 KG/M2 | WEIGHT: 265 LBS | TEMPERATURE: 98 F | HEART RATE: 70 BPM | DIASTOLIC BLOOD PRESSURE: 81 MMHG | SYSTOLIC BLOOD PRESSURE: 152 MMHG | HEIGHT: 70 IN

## 2019-05-28 PROCEDURE — 93926 LOWER EXTREMITY STUDY: CPT | Mod: LT

## 2019-05-28 PROCEDURE — 99213 OFFICE O/P EST LOW 20 MIN: CPT

## 2019-05-28 PROCEDURE — 93923 UPR/LXTR ART STDY 3+ LVLS: CPT

## 2019-05-28 NOTE — HISTORY OF PRESENT ILLNESS
[FreeTextEntry1] : pt  has ongoing tiffany  rt foot slightly worse discomfort intensity very  mild and unchanged since last ov \par pt is on plavix 75 qd and pletal 50 bid \par pt states that he received carotid duplex  by his cardiologist 2-3mo ago\par pt does not have the report \par

## 2019-05-28 NOTE — PHYSICAL EXAM
[Right Carotid Bruit] : right carotid bruit heard [Left Carotid Bruit] : left carotid bruit heard [1+] : right 1+ [2+] : left 2+ [Ankle Swelling (On Exam)] : present [Ankle Swelling Bilaterally] : bilaterally  [Varicose Veins Of Lower Extremities] : bilaterally [] : bilaterally [Ankle Swelling On The Right] : mild [No HSM] : no hepatosplenomegaly [No Rash or Lesion] : No rash or lesion [Alert] : alert [Oriented to Person] : oriented to person [Oriented to Place] : oriented to place [Oriented to Time] : oriented to time [Calm] : calm [JVD] : no jugular venous distention  [Abdomen Masses] : No abdominal masses [Tender] : was nontender [Stool Sample Taken] : No stool obtained  on rectal exam [de-identified] : nad [de-identified] : wnl [FreeTextEntry1] : mild art insuff w mild trophic skin changes\par mod venous insuff w mod st dermatitis [de-identified] : wnl [de-identified] : wnl [de-identified] : cn 2-12 tiffany grossly intact [de-identified] : cooperative

## 2019-05-28 NOTE — DATA REVIEWED
[FreeTextEntry1] : 01/09/2013 Arterial Duplex  rle bypass patent\par \par 01/09/2013 AID aa not seen due to gas\par \par 02/26/2013 ZENIA/PVR no sig art occ dz (sig improvement in rle perfusion pressures) rt zenia 1.06 lt zenia 1.08\par \par 2/25/2014 ZENIA/PVR no sig art occ dz rt zenia 1.15 lt zenia 1.23\par \par 2/25/2014 Arterial Duplex RLE bypass patent LLE no renetta\par \par 2/24/2015 ZENAI/PVR mod infragenic art occ dz tiffany le w vessel calcification  rt zenia 1.21 lt zenia 1.2\par \par 2/24/2015 RLE Art Duplex patent sfa to bk pop bypass\par \par 4/7/2015  LLE Arterial Duplex  sig Lt PA ecatsia 1.7cm no evid of mural thrombus\par \par 4/28/2015 AID AA 1.8cm \par \par 10/06/2015  lle duplex sig for pop art ectasia 1.6cm\par \par 3/4/2016 ZENIA/PVR rle mild to mod infragenic art insuff lle no sig art occ dz sig for vessel calcification rt zenia 1.09 lt zenia 1.13\par \par 3/4/2016 LLE Arterial duplex RENETTA sig 1.8cm diam \par \par 10/21/2016 RLE Art Duplex patent  fem pop byp \par \par 4/19/2017 ZENIA/PVR RLE mild inframall art insuff  lle no sig art insuff rt zenia 1.07 lt zenia 1.22\par \par 4/19/2017 LLE Arterial Doppler  Lt Pop A robby 1.8cm \par \par 10/24/2017  RLE Arterial Duplex  patent fem pop byp  no flow restrictive lesions \par \par 2/28/2018 AID for  s/o AAA not performed awaiting  ins co approval\par \par 2/28/2018 Carotid duplex  tiffany ica less 50% stenosis  tiffany ant  va flow\par \par 5/25/2018 LLE Arterial Duplex Lt RENETTA 1.9cm \par \par 5/25/2018 ZENIA/PVR  RLE mild  infragenic art insuff lle no sig art occ dz  Rt ZENIA 1.09 lt zenia 1.20\par \par 6/27/2018 AID patent AA and tiffany iliac arterial systems w/o robby evidence \par \par 5/28/2019 ZENIA/PVR  RLE mild  infragenic art insuff lle no sig art occ dz  Rt ZENIA 1.04 lt zenia 1.13\par \par 5/28/2019 LLE Arterial Duplex Lt RENETTA 1.9cm \par \par \par

## 2019-05-28 NOTE — ASSESSMENT
[Medication Management] : medication management [Arterial/Venous Disease] : arterial/venous disease [FreeTextEntry1] : Impression stable lt rosa and art insuff stable \par \par [employer] management exercise program, protective measures\par Continue Plavix 75 qd and pletal 50 bid \par ov w AID s/o AAA (pt has smoking hx) 3 years june 2021\par Ov w zenia/pvr s/o art insuff and lle art duplex s/o ROSA 12mo 06/2020\par ov w carotid duplex s/o stenosis  1.5 years aug 2019 \par ov w rle arterial duplex s/o bypass stenosis next avail\par advised pt to rto next ov w the carotid duplex  report for review\par  [Foot care/Footwear] : foot care/footwear

## 2019-05-28 NOTE — REVIEW OF SYSTEMS
[Shortness Of Breath] : shortness of breath [Negative] : Heme/Lymph [FreeTextEntry6] : pt reports the SOB  unchanged in the recent past cardiac w/u as per pt was neg [FreeTextEntry1] : c/o intermittent mouth mucosa bleeding which stops with d/c of plavix

## 2019-05-28 NOTE — DISCUSSION/SUMMARY
[Arterial/Venous Disease] : arterial/venous disease [Medication Management] : medication management [Other: _____] : [unfilled] [Foot care/Footwear] : foot care/footwear [Leg Bypass] : leg bypass [FreeTextEntry1] : \par

## 2019-09-03 ENCOUNTER — APPOINTMENT (OUTPATIENT)
Dept: VASCULAR SURGERY | Facility: CLINIC | Age: 79
End: 2019-09-03
Payer: MEDICARE

## 2019-09-03 VITALS
BODY MASS INDEX: 36.51 KG/M2 | HEIGHT: 70 IN | TEMPERATURE: 98.7 F | SYSTOLIC BLOOD PRESSURE: 135 MMHG | OXYGEN SATURATION: 98 % | DIASTOLIC BLOOD PRESSURE: 87 MMHG | HEART RATE: 80 BPM | WEIGHT: 255 LBS

## 2019-09-03 PROCEDURE — 99214 OFFICE O/P EST MOD 30 MIN: CPT

## 2019-09-03 PROCEDURE — 93926 LOWER EXTREMITY STUDY: CPT | Mod: RT

## 2019-09-03 NOTE — HISTORY OF PRESENT ILLNESS
[FreeTextEntry1] : pt  has ongoing tiffany  rt foot slightly worse discomfort intensity very  mild and unchanged since last ov \par pt is on plavix 75 qd and pletal 50 bid \par pt states that he received carotid duplex  by his cardiologist sev mo ago\par pt did not bring this for review \par \par

## 2019-09-03 NOTE — ASSESSMENT
[Medication Management] : medication management [Arterial/Venous Disease] : arterial/venous disease [Foot care/Footwear] : foot care/footwear [FreeTextEntry1] : Impression stable lt rosa and art insuff stable, patent rle bypass \par \par [employer] management exercise program, protective measures\par Continue Plavix 75 qd and pletal 50 bid \par ov w AID s/o AAA (pt has smoking hx) 3 years june 2021\par Ov w zenia/pvr s/o art insuff and lle art duplex s/o ROSA 12mo 06/2020\par ov w carotid duplex s/o stenosis  1.5 years aug 2019- will hold off till pt brings report for review  \par ov w rle arterial duplex s/o bypass stenosis 12 mo sept 2020 \par advised pt to rto next ov w the carotid duplex  report for review\par

## 2019-09-03 NOTE — PHYSICAL EXAM
[Left Carotid Bruit] : left carotid bruit heard [Right Carotid Bruit] : right carotid bruit heard [Ankle Swelling (On Exam)] : present [2+] : left 2+ [1+] : right 1+ [Ankle Swelling Bilaterally] : bilaterally  [Varicose Veins Of Lower Extremities] : present [] : bilaterally [No HSM] : no hepatosplenomegaly [Ankle Swelling On The Right] : mild [Oriented to Person] : oriented to person [No Rash or Lesion] : No rash or lesion [Alert] : alert [Oriented to Place] : oriented to place [Oriented to Time] : oriented to time [Calm] : calm [JVD] : no jugular venous distention  [Abdomen Masses] : No abdominal masses [Tender] : was nontender [Stool Sample Taken] : No stool obtained  on rectal exam [de-identified] : nad [de-identified] : wnl [de-identified] : tiffany rales  [FreeTextEntry1] : mild art insuff w mild trophic skin changes\par mod venous insuff w mod st dermatitis [de-identified] : wnl [de-identified] : wnl [de-identified] : cn 2-12 tiffany grossly intact [de-identified] : cooperative

## 2019-09-03 NOTE — DATA REVIEWED
[FreeTextEntry1] : 01/09/2013 Arterial Duplex  rle bypass patent\par \par 01/09/2013 AID aa not seen due to gas\par \par 02/26/2013 ZENIA/PVR no sig art occ dz (sig improvement in rle perfusion pressures) rt zenia 1.06 lt zenia 1.08\par \par 2/25/2014 ZENIA/PVR no sig art occ dz rt zenia 1.15 lt zenia 1.23\par \par 2/25/2014 Arterial Duplex RLE bypass patent LLE no renetta\par \par 2/24/2015 ZENIA/PVR mod infragenic art occ dz tiffnay le w vessel calcification  rt zenia 1.21 lt zenia 1.2\par \par 2/24/2015 RLE Art Duplex patent sfa to bk pop bypass\par \par 4/7/2015  LLE Arterial Duplex  sig Lt PA ecatsia 1.7cm no evid of mural thrombus\par \par 4/28/2015 AID AA 1.8cm \par \par 10/06/2015  lle duplex sig for pop art ectasia 1.6cm\par \par 3/4/2016 ZENIA/PVR rle mild to mod infragenic art insuff lle no sig art occ dz sig for vessel calcification rt zenia 1.09 lt zenia 1.13\par \par 3/4/2016 LLE Arterial duplex RENETTA sig 1.8cm diam \par \par 10/21/2016 RLE Art Duplex patent  fem pop byp \par \par 4/19/2017 ZENIA/PVR RLE mild inframall art insuff  lle no sig art insuff rt zenia 1.07 lt zenia 1.22\par \par 4/19/2017 LLE Arterial Doppler  Lt Pop A robby 1.8cm \par \par 10/24/2017  RLE Arterial Duplex  patent fem pop byp  no flow restrictive lesions \par \par 2/28/2018 AID for  s/o AAA not performed awaiting  ins co approval\par \par 2/28/2018 Carotid duplex  tiffany ica less 50% stenosis  tiffany ant  va flow\par \par 5/25/2018 LLE Arterial Duplex Lt RENETTA 1.9cm \par \par 5/25/2018 ZENIA/PVR  RLE mild  infragenic art insuff lle no sig art occ dz  Rt ZENIA 1.09 lt zenia 1.20\par \par 6/27/2018 AID patent AA and tiffany iliac arterial systems w/o robby evidence \par \par 5/28/2019 ZENIA/PVR  RLE mild  infragenic art insuff lle no sig art occ dz  Rt ZENIA 1.04 lt zenia 1.13\par \par 5/28/2019 LLE Arterial Duplex Lt RENETTA 1.9cm \par \par 9/3/2019 RLE Arterial Duplex  patent SFA to BK  pop byp  no flow restrictive lesions \par

## 2019-09-03 NOTE — DISCUSSION/SUMMARY
[Arterial/Venous Disease] : arterial/venous disease [Medication Management] : medication management [Other: _____] : [unfilled] [Leg Bypass] : leg bypass [Foot care/Footwear] : foot care/footwear [FreeTextEntry1] : \par

## 2019-09-10 ENCOUNTER — APPOINTMENT (OUTPATIENT)
Dept: PULMONOLOGY | Facility: CLINIC | Age: 79
End: 2019-09-10
Payer: MEDICARE

## 2019-09-10 VITALS
HEIGHT: 70 IN | DIASTOLIC BLOOD PRESSURE: 80 MMHG | SYSTOLIC BLOOD PRESSURE: 132 MMHG | BODY MASS INDEX: 34.07 KG/M2 | OXYGEN SATURATION: 98 % | RESPIRATION RATE: 14 BRPM | WEIGHT: 238 LBS | HEART RATE: 68 BPM

## 2019-09-10 PROCEDURE — 94618 PULMONARY STRESS TESTING: CPT

## 2019-09-10 PROCEDURE — 99215 OFFICE O/P EST HI 40 MIN: CPT | Mod: 25

## 2019-09-10 PROCEDURE — ZZZZZ: CPT

## 2019-09-10 PROCEDURE — 94729 DIFFUSING CAPACITY: CPT

## 2019-09-10 PROCEDURE — 94060 EVALUATION OF WHEEZING: CPT

## 2019-09-10 PROCEDURE — 71046 X-RAY EXAM CHEST 2 VIEWS: CPT

## 2019-09-10 NOTE — ASSESSMENT
[FreeTextEntry1] : Mr. Luu is a 79 year old male with a history of HTN, PVD, HLD, asbestosis, and COPD (active) who comes to the office today for a follow up pulmonary evaluation. \par \par His shortness of breath is multifactorial due to:\par -cardiac component\par -overweight / out of shape\par -poor breathing mechanics\par -COPD (long term smoking and occupational exposures)\par \par problem 1: COPD\par -add Trelegy 1 puff QD \par -add Ventolin 2 puffs Q6H, pre-exercise\par -add Singulair 10 mg QHS \par -COPD is a progressive disease and although it can’t be cured , appropriate management can slow its progression, reduce frequency and severity of exacerbations, and improve symptoms and the patient quality of life. Hospitalizations are the greatest contributor to the total COPD costs and account for up to 87% of total COPD related costs. Exacerbations are the main cause of admissions and subsequently account for the 40-75% of COPD costs. Inhaled maintenance therapy reduces the incidence of exacerbations in patients with stable COPD. Incorrect inhaler use and nonadherence are major obstacles to achieving COPD treatment goals. Many COPD patients have challenges (impaired inhalation, limited dexterity, reduced cognition: that limit their ability to correctly use their COPD treatment devices resulting in reduced symptom control. Of most importance is smoking cessation and early intervention with respiratory illnesses and contemplation for pulmonary rehab to enhance quality of life.\par \par problem 2: non-allergic rhinitis\par -continue to use Atrovent nasal spray 1 sniff each nostril up to TID \par -can add nasal saline\par \par problem 3: overweight\par -Weight loss, exercise, and diet control were discussed and are highly encouraged. Treatment options were given such as, aqua therapy, and contacting a nutritionist. Recommended to use the elliptical, stationary bike, less use of treadmill. Mindful eating was explained to the patient Obesity is associated with worsening asthma, shortness of breath, and potential for cardiac disease, diabetes, and other underlying medical conditions \par \par problem 4: GERD\par -diet controlled at this point \par Things to avoid including overeating, spicy foods, tight clothing, eating within three hours of bed, this list is not all inclusive. \par -Rule of 2's: avoid eating too much, eating too late, eating too spicy, eating two hours before bed\par -For treatment of reflux, possible options discussed including diet control, H2 blockers, PPIs, as well as coating motility agents discussed as treatment options. Timing of meals and proximity of last meal to sleep were discussed. If symptoms persist, a formal gastrointestinal evaluation is needed.\par \par problem 5: history of smoking / ?lung cancer screening\par -he should have a screening chest CT in 6 months that being April 2017\par -Discussed for five minutes with the patient the risks/associations with continued smoking including COPD, emphysema, shortness of breath, renal cancer, bladder cancer, stroke risk, cardiac disease, etc. Smoking cessation was discussed at length and highly encouraged. Various options to aid cessation was discussed including use of Chantix, Nicotrol, nicotine products, laser therapy, hypnosis, Wellbutrin, etc\par -Lung Cancer Screening is recommended for people between the ages of 55 and 80 with prior 30+ pack year smoking histories. There is irrefutable evidence for realization of lung cancer screening based on two large randomized control trials demonstrating relative reduction in lung cancer mortality for patients undergoing low-dose CT scanning. Risks and benefits reviewed with the patient.\par \par problem 6: snoring - ?OSAS\par -he is being recommended to try "Oxy-Aid" or "Chin Strap" \par -get Home sleep study\par Sleep apnea is associated with adverse clinical consequences which an affect most organ systems. Cardiovascular disease risk includes arrhythmias, atrial fibrillation, hypertension, coronary artery disease, and stroke. Metabolic disorders include diabetes type 2, non-alcoholic fatty liver disease. Mood disorder especially depression; and cognitive decline especially in the elderly. Associations with chronic reflux/Albarado’s esophagus some but not all inclusive. \par -Reasons include arousal consistent with hypopnea; respiratory events most prominent in REM sleep or supine position; therefore sleep staging and body position are important for accurate diagnosis and estimation of AHI.\par -Good sleep hygiene was encouraged including avoiding watching television an hour before bed, keeping caffeine at a low, avoiding reading, television, or anything, in bed, no drinking any liquids three hours before bedtime, and only getting into bed when tired and ready for sleep. \par \par problem 7: sicca\par -being recommended Mouth Kote\par \par problem 8: poor breathing mechanics\par -Proper breathing techniques were reviewed with an emphasis of exhalation. Patient instructed to breath in for 1 second and out for four seconds. Patient was encouraged to not talk while walking. \par \par problem 9: health maintenance \par -recommended yearly flu shot - 2018\par -recommended strep pneumonia vaccines: Prevnar-13 vaccine, followed by Pneumo vaccine 23 one year following\par -recommended early intervention for Upper Respiratory Infections (URIs)\par -recommended regular osteoporosis evaluations\par -recommended early dermatological evaluations\par -recommended after the age of 50 to the age of 70, colonoscopy every 5 years \par -he has received the flu shot \par \par *High visit. Pt has not been since 2017*\par \par F/U in 4 months\par He is encouraged to call with any changes, concerns, or questions

## 2019-09-10 NOTE — PHYSICAL EXAM
[General Appearance - Well Developed] : well developed [Normal Appearance] : normal appearance [General Appearance - Well Nourished] : well nourished [Well Groomed] : well groomed [General Appearance - In No Acute Distress] : no acute distress [No Deformities] : no deformities [Normal Conjunctiva] : the conjunctiva exhibited no abnormalities [Eyelids - No Xanthelasma] : the eyelids demonstrated no xanthelasmas [Normal Oropharynx] : normal oropharynx [Neck Appearance] : the appearance of the neck was normal [Jugular Venous Distention Increased] : there was no jugular-venous distention [Neck Cervical Mass (___cm)] : no neck mass was observed [Thyroid Diffuse Enlargement] : the thyroid was not enlarged [Heart Rate And Rhythm] : heart rate and rhythm were normal [Thyroid Nodule] : there were no palpable thyroid nodules [Heart Sounds] : normal S1 and S2 [Murmurs] : no murmurs present [Respiration, Rhythm And Depth] : normal respiratory rhythm and effort [Exaggerated Use Of Accessory Muscles For Inspiration] : no accessory muscle use [Abdomen Tenderness] : non-tender [Abdomen Soft] : soft [Abdomen Mass (___ Cm)] : no abdominal mass palpated [Gait - Sufficient For Exercise Testing] : the gait was sufficient for exercise testing [Abnormal Walk] : normal gait [Nail Clubbing] : no clubbing of the fingernails [Cyanosis, Localized] : no localized cyanosis [Petechial Hemorrhages (___cm)] : no petechial hemorrhages [Skin Color & Pigmentation] : normal skin color and pigmentation [] : no rash [No Venous Stasis] : no venous stasis [Skin Lesions] : no skin lesions [No Skin Ulcers] : no skin ulcer [No Xanthoma] : no  xanthoma was observed [Deep Tendon Reflexes (DTR)] : deep tendon reflexes were 2+ and symmetric [Sensation] : the sensory exam was normal to light touch and pinprick [No Focal Deficits] : no focal deficits [Oriented To Time, Place, And Person] : oriented to person, place, and time [Impaired Insight] : insight and judgment were intact [Affect] : the affect was normal [II] : II [FreeTextEntry1] : I:E ratio 1:3;  mild forced expiratory wheeze

## 2019-09-10 NOTE — ADDENDUM
[FreeTextEntry1] : All medical record entries made by trish Stovall were at Dr. Pedro Barber's, direction and personally dictated by me on 09/10/2019. I have reviewed the chart and agree that the record accurately reflects my personal performance of the history, physical exam, assessment and plan. I have also personally directed, reviewed, and agree with the discharge instructions.

## 2019-09-10 NOTE — REVIEW OF SYSTEMS
[Fatigue] : fatigue [Recent Wt Loss (___ Lbs)] : recent [unfilled] ~Ulb weight loss [Foot Pain] : foot pain [Snoring] : snoring [As Noted in HPI] : as noted in HPI [Negative] : Pulmonary Hypertension

## 2019-09-10 NOTE — HISTORY OF PRESENT ILLNESS
[FreeTextEntry1] : Mr. Luu is a 76 year old male presenting to the office today for a follow up visit for asbestosis / COPD / ONEYDA. His chief complaint is fatigue / cough.\par -he state that he has been feeling fatigued\par -he repots that he has a frequent sour taste in his mouth, most likely due to his medications\par -he reports that he will be f/u with his eye doctor tomorrow, as his vision has been very blurry lately \par -he states that he wakes up about 3-4x per night to use the bathroom. he does not believe that he snores\par -his neck size is 17"\par -he notes that he has been getting frequent foot pains when he walks\par -he denies any headaches, nausea, vomiting, fever, chills, sweats, chest pain, chest pressure, diarrhea, constipation, dysphagia, dizziness, leg swelling, itchy eyes, itchy ears, heartburn, reflux, or sour taste in the mouth.

## 2019-09-10 NOTE — PROCEDURE
[FreeTextEntry1] : PFT - spi reveals moderate obstructive dysfunction ; FEV1 is 1.94 which is 63% of predicted, normal flow volume loop. Normal Diffusion, DLCO is 24.0 which is 105% of predicted. \par \par CXR reveals moderate cardiomegaly, s/p OHS--fx sternal wire-no infiltrate or effusion\par \par 6 minute walk test reveals a low saturation of 94% with no evidence of dyspnea or fatigue; walked 423.8 meters

## 2019-09-23 ENCOUNTER — FORM ENCOUNTER (OUTPATIENT)
Age: 79
End: 2019-09-23

## 2019-09-24 ENCOUNTER — APPOINTMENT (OUTPATIENT)
Dept: CT IMAGING | Facility: IMAGING CENTER | Age: 79
End: 2019-09-24
Payer: MEDICARE

## 2019-09-24 ENCOUNTER — OUTPATIENT (OUTPATIENT)
Dept: OUTPATIENT SERVICES | Facility: HOSPITAL | Age: 79
LOS: 1 days | End: 2019-09-24
Payer: MEDICARE

## 2019-09-24 DIAGNOSIS — R93.89 ABNORMAL FINDINGS ON DIAGNOSTIC IMAGING OF OTHER SPECIFIED BODY STRUCTURES: ICD-10-CM

## 2019-09-24 PROCEDURE — 71250 CT THORAX DX C-: CPT | Mod: 26

## 2019-09-24 PROCEDURE — 71250 CT THORAX DX C-: CPT

## 2019-10-11 ENCOUNTER — APPOINTMENT (OUTPATIENT)
Dept: SLEEP CENTER | Facility: CLINIC | Age: 79
End: 2019-10-11

## 2019-10-11 ENCOUNTER — RX RENEWAL (OUTPATIENT)
Age: 79
End: 2019-10-11

## 2019-12-04 ENCOUNTER — APPOINTMENT (OUTPATIENT)
Dept: SLEEP CENTER | Facility: CLINIC | Age: 79
End: 2019-12-04

## 2020-01-14 ENCOUNTER — APPOINTMENT (OUTPATIENT)
Dept: PULMONOLOGY | Facility: CLINIC | Age: 80
End: 2020-01-14
Payer: MEDICARE

## 2020-01-14 ENCOUNTER — NON-APPOINTMENT (OUTPATIENT)
Age: 80
End: 2020-01-14

## 2020-01-14 VITALS
OXYGEN SATURATION: 96 % | DIASTOLIC BLOOD PRESSURE: 80 MMHG | WEIGHT: 238 LBS | BODY MASS INDEX: 37.35 KG/M2 | SYSTOLIC BLOOD PRESSURE: 140 MMHG | HEART RATE: 60 BPM | HEIGHT: 67 IN | RESPIRATION RATE: 16 BRPM

## 2020-01-14 DIAGNOSIS — R93.89 ABNORMAL FINDINGS ON DIAGNOSTIC IMAGING OF OTHER SPECIFIED BODY STRUCTURES: ICD-10-CM

## 2020-01-14 DIAGNOSIS — R06.83 SNORING: ICD-10-CM

## 2020-01-14 DIAGNOSIS — J31.0 CHRONIC RHINITIS: ICD-10-CM

## 2020-01-14 DIAGNOSIS — M35.00 SICCA SYNDROME, UNSPECIFIED: ICD-10-CM

## 2020-01-14 PROCEDURE — 99214 OFFICE O/P EST MOD 30 MIN: CPT | Mod: 25

## 2020-01-14 PROCEDURE — G0009: CPT

## 2020-01-14 PROCEDURE — 90732 PPSV23 VACC 2 YRS+ SUBQ/IM: CPT

## 2020-01-14 PROCEDURE — 94010 BREATHING CAPACITY TEST: CPT

## 2020-01-14 NOTE — ASSESSMENT
[FreeTextEntry1] : Mr. Luu is a 79 year old male with a history of HTN, PVD, HLD, asbestosis, and COPD who comes to the office today for a follow up pulmonary evaluation - ?OSAS\par \par His shortness of breath is multifactorial due to:\par -cardiac component\par -overweight / out of shape\par -poor breathing mechanics\par -COPD (long term smoking and occupational exposures)\par \par problem 1: COPD\par -Add Symbicort (160) 2 inhalations BID\par -Add Spiriva 1 inhalation QD \par -continue Ventolin 2 puffs Q6H, pre-exercise\par -continue Singulair 10 mg QHS \par -COPD is a progressive disease and although it can’t be cured , appropriate management can slow its progression, reduce frequency and severity of exacerbations, and improve symptoms and the patient quality of life. Hospitalizations are the greatest contributor to the total COPD costs and account for up to 87% of total COPD related costs. Exacerbations are the main cause of admissions and subsequently account for the 40-75% of COPD costs. Inhaled maintenance therapy reduces the incidence of exacerbations in patients with stable COPD. Incorrect inhaler use and nonadherence are major obstacles to achieving COPD treatment goals. Many COPD patients have challenges (impaired inhalation, limited dexterity, reduced cognition: that limit their ability to correctly use their COPD treatment devices resulting in reduced symptom control. Of most importance is smoking cessation and early intervention with respiratory illnesses and contemplation for pulmonary rehab to enhance quality of life.\par \par problem 2: non-allergic rhinitis\par -continue to use Atrovent (0.6) nasal spray 1 sniff each nostril up to TID \par -can add nasal saline\par \par problem 3: overweight\par -Weight loss, exercise, and diet control were discussed and are highly encouraged. Treatment options were given such as, aqua therapy, and contacting a nutritionist. Recommended to use the elliptical, stationary bike, less use of treadmill. Mindful eating was explained to the patient Obesity is associated with worsening asthma, shortness of breath, and potential for cardiac disease, diabetes, and other underlying medical conditions \par \par problem 4: GERD\par -diet controlled at this point \par -Add Pepcid 40 mg QHS \par Things to avoid including overeating, spicy foods, tight clothing, eating within three hours of bed, this list is not all inclusive. \par -Rule of 2's: avoid eating too much, eating too late, eating too spicy, eating two hours before bed\par -For treatment of reflux, possible options discussed including diet control, H2 blockers, PPIs, as well as coating motility agents discussed as treatment options. Timing of meals and proximity of last meal to sleep were discussed. If symptoms persist, a formal gastrointestinal evaluation is needed.\par \par problem 5: history of smoking / ?lung cancer screening - nodule\par -he should have a screening chest CT 9/2020\par -Discussed for five minutes with the patient the risks/associations with continued smoking including COPD, emphysema, shortness of breath, renal cancer, bladder cancer, stroke risk, cardiac disease, etc. Smoking cessation was discussed at length and highly encouraged. Various options to aid cessation was discussed including use of Chantix, Nicotrol, nicotine products, laser therapy, hypnosis, Wellbutrin, etc\par -Lung Cancer Screening is recommended for people between the ages of 55 and 80 with prior 30+ pack year smoking histories. There is irrefutable evidence for realization of lung cancer screening based on two large randomized control trials demonstrating relative reduction in lung cancer mortality for patients undergoing low-dose CT scanning. Risks and benefits reviewed with the patient.\par \par problem 6: snoring - ?OSAS\par -he is being recommended to try "Oxy-Aid" or "Chin Strap" \par -get Home sleep study\par Sleep apnea is associated with adverse clinical consequences which an affect most organ systems. Cardiovascular disease risk includes arrhythmias, atrial fibrillation, hypertension, coronary artery disease, and stroke. Metabolic disorders include diabetes type 2, non-alcoholic fatty liver disease. Mood disorder especially depression; and cognitive decline especially in the elderly. Associations with chronic reflux/Albarado’s esophagus some but not all inclusive. \par -Reasons include arousal consistent with hypopnea; respiratory events most prominent in REM sleep or supine position; therefore sleep staging and body position are important for accurate diagnosis and estimation of AHI.\par -Good sleep hygiene was encouraged including avoiding watching television an hour before bed, keeping caffeine at a low, avoiding reading, television, or anything, in bed, no drinking any liquids three hours before bedtime, and only getting into bed when tired and ready for sleep. \par \par problem 7: sicca\par -being recommended Mouth Kote\par \par problem 8: poor breathing mechanics\par -Proper breathing techniques were reviewed with an emphasis of exhalation. Patient instructed to breath in for 1 second and out for four seconds. Patient was encouraged to not talk while walking. \par \par problem 9: health maintenance \par -recommended yearly flu shot - 2019\par -recommended strep pneumonia vaccines: Prevnar-13 vaccine, followed by Pneumo vaccine 23 (1/2020) one year following (completed)\par -recommended early intervention for Upper Respiratory Infections (URIs)\par -recommended regular osteoporosis evaluations\par -recommended early dermatological evaluations\par -recommended after the age of 50 to the age of 70, colonoscopy every 5 years \par -he has received the flu shot \par \par F/U in 4 months\par He is encouraged to call with any changes, concerns, or questions

## 2020-01-14 NOTE — HISTORY OF PRESENT ILLNESS
[FreeTextEntry1] : Mr. Luu is a 79 year old male presenting to the office today for a follow up visit for asbestosis / COPD / ONEYDA. His chief complaint is low energy level.\par -he notes his energy level is lower than usual (5-7/10), due to less frequent exercise\par -he notes he has not pursued his JAZMIN treatments / sleep study\par -he reports he is snoring\par -he states he wakes up at night hungry (3AM), eats something, and doesn't return to bed, but sits in his recliner instead\par -he states his wife tells him he snores\par -he is s/p one PNA shot\par -he denies taking any new medications, vitamins, or supplements, except he was placed on Metformin\par -he denies any chest pain, chest pressure, diarrhea, constipation, dysphagia, dizziness, sour taste in the mouth, heartburn, reflux

## 2020-01-14 NOTE — PROCEDURE
[FreeTextEntry1] : PFT revealed normal flows, with a FEV1 of 2.01L, which is 77% of predicted, with a normal flow volume loop \par \par Chest CT (9.24.19) reveals emphysema. Unchanged right middle lobe 7 mm groundglass nodule. Right middle and lower lobe clustered nodules are new from the prior study and likely represent mucoid impaction. Six-month follow-up CT recommended for complete evaluation.

## 2020-01-14 NOTE — ADDENDUM
[FreeTextEntry1] : Documented by Chivo Stevens acting as a scribe for Dr. Pedro Barber on 01/14/2020.\par \par All medical record entries made by the Scribe were at my, Dr. Pedro Barber's, direction and personally dictated by me on 01/14/2020. I have reviewed the chart and agree that the record accurately reflects my personal performance of the history, physical exam, assessment and plan. I have also personally directed, reviewed, and agree with the discharge instructions.

## 2020-01-14 NOTE — PHYSICAL EXAM
[General Appearance - Well Developed] : well developed [Normal Appearance] : normal appearance [Well Groomed] : well groomed [General Appearance - Well Nourished] : well nourished [General Appearance - In No Acute Distress] : no acute distress [No Deformities] : no deformities [Normal Conjunctiva] : the conjunctiva exhibited no abnormalities [Normal Oropharynx] : normal oropharynx [Eyelids - No Xanthelasma] : the eyelids demonstrated no xanthelasmas [Neck Appearance] : the appearance of the neck was normal [Neck Cervical Mass (___cm)] : no neck mass was observed [Thyroid Diffuse Enlargement] : the thyroid was not enlarged [Jugular Venous Distention Increased] : there was no jugular-venous distention [Heart Rate And Rhythm] : heart rate and rhythm were normal [Thyroid Nodule] : there were no palpable thyroid nodules [Heart Sounds] : normal S1 and S2 [Murmurs] : no murmurs present [Respiration, Rhythm And Depth] : normal respiratory rhythm and effort [Exaggerated Use Of Accessory Muscles For Inspiration] : no accessory muscle use [Abdomen Soft] : soft [Abdomen Tenderness] : non-tender [Abdomen Mass (___ Cm)] : no abdominal mass palpated [Nail Clubbing] : no clubbing of the fingernails [Gait - Sufficient For Exercise Testing] : the gait was sufficient for exercise testing [Abnormal Walk] : normal gait [Cyanosis, Localized] : no localized cyanosis [Petechial Hemorrhages (___cm)] : no petechial hemorrhages [Skin Color & Pigmentation] : normal skin color and pigmentation [] : no rash [No Skin Ulcers] : no skin ulcer [Skin Lesions] : no skin lesions [No Venous Stasis] : no venous stasis [No Xanthoma] : no  xanthoma was observed [Sensation] : the sensory exam was normal to light touch and pinprick [Deep Tendon Reflexes (DTR)] : deep tendon reflexes were 2+ and symmetric [Oriented To Time, Place, And Person] : oriented to person, place, and time [No Focal Deficits] : no focal deficits [Impaired Insight] : insight and judgment were intact [Affect] : the affect was normal [III] : III [Auscultation Breath Sounds / Voice Sounds] : lungs were clear to auscultation bilaterally [FreeTextEntry1] : I:E ratio 1:3;  clear

## 2020-03-02 ENCOUNTER — RX RENEWAL (OUTPATIENT)
Age: 80
End: 2020-03-02

## 2020-05-14 ENCOUNTER — APPOINTMENT (OUTPATIENT)
Dept: PULMONOLOGY | Facility: CLINIC | Age: 80
End: 2020-05-14

## 2020-06-12 NOTE — STROKE CODE NOTE - NIH STROKE SCALE: 7. LIMB ATAXIA, QM
"Called the pt.    Pt admits to blood in stool, nearly daily, but not every day. Describes blood in stool as a teaspoon amount of \"jelly mass\" and dark red.    States that they are experiencing their \"normal\" fatigue, denies it being worse. Denies dizziness, shortness of breath.    Reviewed reason for going to the ER. Pt stated understanding, but states they currently feel fine.    Pt has upcoming colorectal appt on Monday in the morning. Pt was instructed to discontinue their eliquis today in preparation for appt.      - Beltran \"Hayder\" Jennifer, BIANCA - Patient Advocate Liason (PAL)  [facility]        ----- Message from LORELEI Faith CNP sent at 6/12/2020  1:39 PM CDT -----  Patient has not responded to SIZESEEKER message  Hemoglobin is about 2 points lower than is was previously, which is concerning to me.  I see he has a flex sig scheduled. He recently saw colorectal for this issue. Please find out more info, as it sounds like at their last visit the bleeding has stopped.    When did the bleeding re-start and how much is there?  Any new dizziness or fatigue?    Please have them notify colorectal  To the ER this weekend for any increase in bleeding ,shortness of breath, dizziness, etc.    Fernando,    DONNA Oh-JONATHON.      " (0) Absent

## 2020-12-01 ENCOUNTER — APPOINTMENT (OUTPATIENT)
Dept: VASCULAR SURGERY | Facility: CLINIC | Age: 80
End: 2020-12-01

## 2020-12-15 ENCOUNTER — APPOINTMENT (OUTPATIENT)
Dept: VASCULAR SURGERY | Facility: CLINIC | Age: 80
End: 2020-12-15
Payer: MEDICARE

## 2020-12-15 VITALS
SYSTOLIC BLOOD PRESSURE: 144 MMHG | BODY MASS INDEX: 35.79 KG/M2 | WEIGHT: 250 LBS | DIASTOLIC BLOOD PRESSURE: 84 MMHG | HEIGHT: 70 IN | HEART RATE: 78 BPM | TEMPERATURE: 97.4 F

## 2020-12-15 PROCEDURE — 93926 LOWER EXTREMITY STUDY: CPT

## 2020-12-15 PROCEDURE — 93923 UPR/LXTR ART STDY 3+ LVLS: CPT

## 2020-12-15 PROCEDURE — 99072 ADDL SUPL MATRL&STAF TM PHE: CPT

## 2020-12-15 PROCEDURE — 99214 OFFICE O/P EST MOD 30 MIN: CPT

## 2020-12-15 RX ORDER — LANCETS 30 GAUGE
EACH MISCELLANEOUS
Qty: 1 | Refills: 0 | Status: ACTIVE | COMMUNITY
Start: 2020-02-03 | End: 1900-01-01

## 2020-12-15 RX ORDER — FOLIC ACID 20 MG
CAPSULE ORAL
Refills: 0 | Status: ACTIVE | COMMUNITY

## 2020-12-15 RX ORDER — BLOOD SUGAR DIAGNOSTIC
STRIP MISCELLANEOUS
Qty: 100 | Refills: 0 | Status: ACTIVE | COMMUNITY
Start: 2020-02-12

## 2020-12-15 RX ORDER — ATORVASTATIN CALCIUM 20 MG/1
20 TABLET, FILM COATED ORAL
Refills: 0 | Status: DISCONTINUED | COMMUNITY
End: 2020-12-15

## 2020-12-15 NOTE — DATA REVIEWED
[FreeTextEntry1] : 01/09/2013 Arterial Duplex  rle bypass patent\par \par 01/09/2013 AID aa not seen due to gas\par \par 02/26/2013 ZENIA/PVR no sig art occ dz (sig improvement in rle perfusion pressures) rt zenia 1.06 lt zenia 1.08\par \par 2/25/2014 ZENIA/PVR no sig art occ dz rt zenia 1.15 lt zenia 1.23\par \par 2/25/2014 Arterial Duplex RLE bypass patent LLE no renetta\par \par 2/24/2015 ZENIA/PVR mod infragenic art occ dz tiffany le w vessel calcification  rt zenia 1.21 lt zenia 1.2\par \par 2/24/2015 RLE Art Duplex patent sfa to bk pop bypass\par \par 4/7/2015  LLE Arterial Duplex  sig Lt PA ecatsia 1.7cm no evid of mural thrombus\par \par 4/28/2015 AID AA 1.8cm \par \par 10/06/2015  lle duplex sig for pop art ectasia 1.6cm\par \par 3/4/2016 ZENIA/PVR rle mild to mod infragenic art insuff lle no sig art occ dz sig for vessel calcification rt zenia 1.09 lt zenia 1.13\par \par 3/4/2016 LLE Arterial duplex RENETTA sig 1.8cm diam \par \par 10/21/2016 RLE Art Duplex patent  fem pop byp \par \par 4/19/2017 ZENIA/PVR RLE mild inframall art insuff  lle no sig art insuff rt zenia 1.07 lt zenia 1.22\par \par 4/19/2017 LLE Arterial Doppler  Lt Pop A robby 1.8cm \par \par 10/24/2017  RLE Arterial Duplex  patent fem pop byp  no flow restrictive lesions \par \par 2/28/2018 AID for  s/o AAA not performed awaiting  ins co approval\par \par 2/28/2018 Carotid duplex  tiffany ica less 50% stenosis  tiffany ant  va flow\par \par 5/25/2018 LLE Arterial Duplex Lt RENETTA 1.9cm \par \par 5/25/2018 ZENIA/PVR  RLE mild  infragenic art insuff lle no sig art occ dz  Rt ZENIA 1.09 lt zenia 1.20\par \par 6/27/2018 AID patent AA and tiffany iliac arterial systems w/o robby evidence \par \par 5/28/2019 ZENIA/PVR  RLE mild  infragenic art insuff lle no sig art occ dz  Rt ZENIA 1.04 lt zenia 1.13\par \par 5/28/2019 LLE Arterial Duplex Lt RENETTA 1.9cm \par \par 9/3/2019 RLE Arterial Duplex  patent SFA to BK  pop byp  no flow restrictive lesions \par \par 12/15/2020 ZENIA/PVR  RLE mild  infragenic art insuff lle no sig art occ dz  \par                                             Rt ZENIA 1.11 lt zenia 1.12\par \par 12/15/2020 RLE Arterial Duplex  patent SFA to BK  pop byp  no flow restrictive lesions \par \par

## 2020-12-15 NOTE — PHYSICAL EXAM
[Right Carotid Bruit] : right carotid bruit heard [Left Carotid Bruit] : left carotid bruit heard [1+] : right 1+ [2+] : left 2+ [Ankle Swelling (On Exam)] : present [Ankle Swelling Bilaterally] : bilaterally  [Varicose Veins Of Lower Extremities] : bilaterally [] : bilaterally [Ankle Swelling On The Right] : mild [No HSM] : no hepatosplenomegaly [No Rash or Lesion] : No rash or lesion [Alert] : alert [Oriented to Person] : oriented to person [Oriented to Place] : oriented to place [Oriented to Time] : oriented to time [Calm] : calm [JVD] : no jugular venous distention  [Abdomen Masses] : No abdominal masses [Tender] : was nontender [Stool Sample Taken] : No stool obtained  on rectal exam [de-identified] : nad [de-identified] : wnl [de-identified] : tiffany rales  [FreeTextEntry1] : mild art insuff w mild trophic skin changes\par mod venous insuff w mod st dermatitis [de-identified] : wnl [de-identified] : wnl [de-identified] : cn 2-12 tiffany grossly intact [de-identified] : cooperative

## 2020-12-15 NOTE — ASSESSMENT
[Arterial/Venous Disease] : arterial/venous disease [Medication Management] : medication management [Foot care/Footwear] : foot care/footwear [FreeTextEntry1] : Impression stable lt rosa and art insuff stable, patent rle bypass \par \par [employer] management exercise program, protective measures\par Continue Plavix 75 qd and pletal 50 bid \par \par Ov w zenia/pvr s/o art insuff and lle art duplex s/o ROSA 12mo dec /2021\par ov w AID s/o AAA (pt has smoking hx) 3 years and  rle arterial duplex s/o bypass stenosis june 2021\par advised pt to rto next ov w the carotid duplex  report for review\par carotid duplex s/o stenosis   jan/feb 2021 then telehealth

## 2020-12-15 NOTE — HISTORY OF PRESENT ILLNESS
[FreeTextEntry1] : pt  has ongoing tiffany  rt foot slightly worse discomfort intensity very  mild and unchanged since last ov \par pt is on plavix 75 qd and pletal 50 bid \par pt states that he received carotid duplex  by his cardiologist sev mo ago\par pt did not bring this for review \par \par  [de-identified] : pt reports tiffany le and foot  mild intermittent discomfort \par overall unchanged\par pt is on plavix 75 mf daily\par pt is on pletal 50 bid

## 2021-01-06 ENCOUNTER — APPOINTMENT (OUTPATIENT)
Dept: PULMONOLOGY | Facility: CLINIC | Age: 81
End: 2021-01-06
Payer: MEDICARE

## 2021-01-06 VITALS
SYSTOLIC BLOOD PRESSURE: 159 MMHG | TEMPERATURE: 97.2 F | OXYGEN SATURATION: 98 % | BODY MASS INDEX: 39.05 KG/M2 | WEIGHT: 243 LBS | HEART RATE: 81 BPM | DIASTOLIC BLOOD PRESSURE: 84 MMHG | HEIGHT: 66 IN

## 2021-01-06 PROCEDURE — 99204 OFFICE O/P NEW MOD 45 MIN: CPT | Mod: 25

## 2021-01-06 PROCEDURE — 71046 X-RAY EXAM CHEST 2 VIEWS: CPT

## 2021-01-06 PROCEDURE — 99072 ADDL SUPL MATRL&STAF TM PHE: CPT

## 2021-01-06 RX ORDER — AMOXICILLIN 500 MG/1
500 CAPSULE ORAL
Qty: 21 | Refills: 0 | Status: DISCONTINUED | COMMUNITY
Start: 2018-05-21 | End: 2021-01-06

## 2021-01-06 RX ORDER — MONTELUKAST 10 MG/1
10 TABLET, FILM COATED ORAL
Qty: 90 | Refills: 1 | Status: DISCONTINUED | COMMUNITY
Start: 2019-09-10 | End: 2021-01-06

## 2021-01-06 RX ORDER — METFORMIN HYDROCHLORIDE 500 MG/1
500 TABLET, COATED ORAL
Refills: 0 | Status: DISCONTINUED | COMMUNITY
End: 2021-01-06

## 2021-01-06 RX ORDER — PREDNISONE 20 MG/1
20 TABLET ORAL
Qty: 21 | Refills: 0 | Status: DISCONTINUED | COMMUNITY
Start: 2017-11-05 | End: 2021-01-06

## 2021-01-06 RX ORDER — BUDESONIDE AND FORMOTEROL FUMARATE DIHYDRATE 160; 4.5 UG/1; UG/1
160-4.5 AEROSOL RESPIRATORY (INHALATION)
Qty: 1 | Refills: 5 | Status: DISCONTINUED | COMMUNITY
Start: 2018-02-28 | End: 2021-01-06

## 2021-01-06 RX ORDER — MONTELUKAST 10 MG/1
TABLET, FILM COATED ORAL
Refills: 0 | Status: DISCONTINUED | COMMUNITY
End: 2021-01-06

## 2021-01-06 RX ORDER — METFORMIN HYDROCHLORIDE 500 MG/5ML
SOLUTION ORAL
Refills: 0 | Status: DISCONTINUED | COMMUNITY
End: 2021-01-06

## 2021-01-06 NOTE — HISTORY OF PRESENT ILLNESS
[Former] : former [TextBox_4] : ALEXIA MASON is a 80 year old  M referred for pulmonary evaluation for COPD\par \par Difficulty with ODOM 1/2 flight stairs or walking 100 feet.\par No cough. Occ chest discomfort. Some wheezing.\par Worse over past few months.\par No definitive wt. gain but sedentary\par Poor compliance to bronchodilators. Using symbicort OD and no spiriva. \par Last CXR 7-8 months ago.\par \par Past pulmonary history. 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 N\par Recent travel N\par Pets N\par \par Never did pulm rehab. \par \par Checks BS daily\par Last A1C 6.8\par  [TextBox_11] : 2 1/2 [TextBox_13] : 42 [YearQuit] : 1990

## 2021-01-06 NOTE — PHYSICAL EXAM
[No Acute Distress] : no acute distress [TextBox_54] : S/p median sternotomy. [TextBox_68] : Prolongation exp. Wheeze only on forced expiration.

## 2021-01-06 NOTE — PROCEDURE
[FreeTextEntry1] : \par \par EXAM: CT CHEST \par \par \par PROCEDURE DATE: 09/24/2019 \par \par \par \par INTERPRETATION: EXAMINATION: CT CHEST \par \par CLINICAL INDICATION: Dyspnea, lung nodules. \par \par TECHNIQUE: Noncontrast CT of the chest was obtained. \par \par COMPARISON: 8/23/2017. \par \par FINDINGS: \par \par AIRWAYS AND LUNGS: The central tracheobronchial tree is patent. Emphysema. \par Right middle and lower lobe clustered nodules are new from the prior study.. \par Unchanged right upper lobe 7 mm groundglass nodule (2, 51). \par \par MEDIASTINUM AND PLEURA: There are no enlarged mediastinal, hilar or axillary \par lymph nodes. The visualized portion of the thyroid gland is unremarkable. \par There is no pleural effusion. There is no pneumothorax. \par \par HEART AND VESSELS: The heart is normal in size. There are atherosclerotic \par calcifications of the aorta and coronary arteries. There is no pericardial \par effusion. \par \par UPPER ABDOMEN: Images of the upper abdomen demonstrate cholelithiasis. \par \par BONES AND SOFT TISSUES: There are mild degenerative changes of the spine. \par The soft tissues are unremarkable. \par \par TUBES/LINES: None. \par \par IMPRESSION: \par Emphysema. Unchanged right middle lobe 7 mm groundglass nodule. Right middle \par and lower lobe clustered nodules are new from the prior study and likely \par represent mucoid impaction. Six-month follow-up CT recommended for complete \par evaluation. \par \par \par \par \par IVETTE NAYAK M.D., ATTENDING RADIOLOGIST \par This document has been electronically signed. Sep 25 2019 9:59AM

## 2021-01-06 NOTE — ASSESSMENT
[FreeTextEntry1] : Consider pulmonary rehab.  After pandemic. \par Begin exercise program with walking at this time.\par Weight loss recommended and discussed.\par Increase Symbicort to twice daily and restart Spiriva.  Reviewed.\par PRN Beta Agonist.\par For pulmonary function testing.\par Follow-up CAT scan of the chest.\par Follow-up in 1 month to assess response to therapy.\par \par

## 2021-01-06 NOTE — CONSULT LETTER
[Dear  ___] : Dear ~LELAND, [Consult Letter:] : I had the pleasure of evaluating your patient, [unfilled]. [Please see my note below.] : Please see my note below. [Consult Closing:] : Thank you very much for allowing me to participate in the care of this patient.  If you have any questions, please do not hesitate to contact me. [Sincerely,] : Sincerely, [FreeTextEntry2] : Ross Graff MD\par  [FreeTextEntry3] : Clem Summers MD FCCP\par

## 2021-01-06 NOTE — DISCUSSION/SUMMARY
[FreeTextEntry1] : Chronic obstructive pulmonary disease.  Increased symptoms with poor compliance to bronchodilator therapy.\par Increased shortness of breath likely related to above as well as weight and conditioning.\par Pulmonary nodules.\par Overweight.\par Diabetes mellitus.\par

## 2021-01-07 LAB — SARS-COV-2 N GENE NPH QL NAA+PROBE: NOT DETECTED

## 2021-01-11 ENCOUNTER — APPOINTMENT (OUTPATIENT)
Dept: PULMONOLOGY | Facility: CLINIC | Age: 81
End: 2021-01-11
Payer: MEDICARE

## 2021-01-11 VITALS
DIASTOLIC BLOOD PRESSURE: 83 MMHG | SYSTOLIC BLOOD PRESSURE: 156 MMHG | TEMPERATURE: 97.8 F | OXYGEN SATURATION: 98 % | HEART RATE: 75 BPM

## 2021-01-11 LAB — POCT - HEMOGLOBIN (HGB), QUANTITATIVE, TRANSCUTANEOUS: 15

## 2021-01-11 PROCEDURE — 88738 HGB QUANT TRANSCUTANEOUS: CPT

## 2021-01-11 PROCEDURE — 99072 ADDL SUPL MATRL&STAF TM PHE: CPT

## 2021-01-11 PROCEDURE — 94060 EVALUATION OF WHEEZING: CPT

## 2021-01-11 PROCEDURE — 94729 DIFFUSING CAPACITY: CPT

## 2021-01-11 PROCEDURE — 94726 PLETHYSMOGRAPHY LUNG VOLUMES: CPT

## 2021-02-03 ENCOUNTER — APPOINTMENT (OUTPATIENT)
Dept: VASCULAR SURGERY | Facility: CLINIC | Age: 81
End: 2021-02-03

## 2021-02-03 ENCOUNTER — APPOINTMENT (OUTPATIENT)
Dept: VASCULAR SURGERY | Facility: CLINIC | Age: 81
End: 2021-02-03
Payer: MEDICARE

## 2021-02-03 PROCEDURE — 99441: CPT
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
## 2021-02-03 NOTE — REASON FOR VISIT
[Follow-Up: _____] : a [unfilled] follow-up visit [Home] : at home, [unfilled] , at the time of the visit. [Medical Office: ([facility])___] : at the medical office located in  [Other:____] : [unfilled] [Verbal consent obtained from patient] : the patient, [unfilled] [FreeTextEntry1] : i missed my carotid duplex visit

## 2021-02-03 NOTE — PHYSICAL EXAM
[Alert] : alert [Oriented to Person] : oriented to person [Oriented to Place] : oriented to place [Oriented to Time] : oriented to time [Calm] : calm [FreeTextEntry1] : Physical exam findings via telephonic review with patient \par \par  [de-identified] : cooperative

## 2021-02-03 NOTE — ASSESSMENT
[Arterial/Venous Disease] : arterial/venous disease [Medication Management] : medication management [Foot care/Footwear] : foot care/footwear [FreeTextEntry1] : Impression stable lt rosa and art insuff stable, patent rle bypass \par \par [employer] management exercise program, protective measures\par Continue Plavix 75 qd and pletal 50 bid \par Ov w zenia/pvr s/o art insuff and lle art duplex s/o ROSA 12mo dec /2021\par ov w AID s/o AAA (pt has smoking hx) 3 years and  rle arterial duplex s/o bypass stenosis june 2021\par will tim carotid duplex s/o stenosis   jan/feb 2021 then telehealth\par Telephonic visit  time duration 10 min\par \par \par

## 2021-02-08 ENCOUNTER — APPOINTMENT (OUTPATIENT)
Dept: VASCULAR SURGERY | Facility: CLINIC | Age: 81
End: 2021-02-08
Payer: MEDICARE

## 2021-02-08 PROCEDURE — 93880 EXTRACRANIAL BILAT STUDY: CPT

## 2021-02-08 PROCEDURE — 99072 ADDL SUPL MATRL&STAF TM PHE: CPT

## 2021-02-10 ENCOUNTER — APPOINTMENT (OUTPATIENT)
Dept: VASCULAR SURGERY | Facility: CLINIC | Age: 81
End: 2021-02-10
Payer: MEDICARE

## 2021-02-10 ENCOUNTER — APPOINTMENT (OUTPATIENT)
Dept: PULMONOLOGY | Facility: CLINIC | Age: 81
End: 2021-02-10

## 2021-02-10 DIAGNOSIS — I65.23 OCCLUSION AND STENOSIS OF BILATERAL CAROTID ARTERIES: ICD-10-CM

## 2021-02-10 PROCEDURE — 99442: CPT

## 2021-02-10 NOTE — HISTORY OF PRESENT ILLNESS
[FreeTextEntry1] : pt  has ongoing tiffany  rt foot slightly worse discomfort intensity very  mild and unchanged since last ov \par pt is on plavix 75 qd and pletal 50 bid \par pt states that he received carotid duplex  by his cardiologist sev mo ago\par pt did not bring this for review \par \par  [de-identified] : pt reports tiffany le and foot  mild intermittent discomfort \par overall unchanged since last ov\par pt is on plavix 75 mf daily\par pt is on pletal 50 bid \par pt states no le wounds

## 2021-02-10 NOTE — ASSESSMENT
[Arterial/Venous Disease] : arterial/venous disease [Medication Management] : medication management [Foot care/Footwear] : foot care/footwear [FreeTextEntry1] : Impression stable lt rosa and art insuff stable, patent rle bypass and carotid stenosis\par \par [employer] management exercise program, protective measures\par Continue Plavix 75 qd and pletal 50 bid \par Ov w zenia/pvr s/o art insuff and lle art duplex s/o ROSA 12mo dec /2021\par ov w AID s/o AAA (pt has smoking hx) 3 years and  rle arterial duplex s/o bypass stenosis june 2021\par carotid duplex s/o stenosis 2 years feb 2023 then telehealth\par Telephonic visit  time duration 15 min\par \par \par

## 2021-02-10 NOTE — REASON FOR VISIT
[Follow-Up: _____] : a [unfilled] follow-up visit [Home] : at home, [unfilled] , at the time of the visit. [Medical Office: ([facility])___] : at the medical office located in  [Other:____] : [unfilled] [Verbal consent obtained from patient] : the patient, [unfilled] [FreeTextEntry1] : i have a carotid blockage

## 2021-02-10 NOTE — PHYSICAL EXAM
[Alert] : alert [Oriented to Person] : oriented to person [Oriented to Place] : oriented to place [Oriented to Time] : oriented to time [Calm] : calm [de-identified] : no resp distress [FreeTextEntry1] : Physical exam findings via telephonic review with patient \par \par  [de-identified] : cooperative

## 2021-02-10 NOTE — DATA REVIEWED
[FreeTextEntry1] : 01/09/2013 Arterial Duplex  rle bypass patent\par \par 01/09/2013 AID aa not seen due to gas\par \par 02/26/2013 ZENIA/PVR no sig art occ dz (sig improvement in rle perfusion pressures) rt zenia 1.06 lt zenia 1.08\par \par 2/25/2014 ZENIA/PVR no sig art occ dz rt zenia 1.15 lt zenia 1.23\par \par 2/25/2014 Arterial Duplex RLE bypass patent LLE no renetta\par \par 2/24/2015 ZENIA/PVR mod infragenic art occ dz tiffany le w vessel calcification  rt zenia 1.21 lt zenia 1.2\par \par 2/24/2015 RLE Art Duplex patent sfa to bk pop bypass\par \par 4/7/2015  LLE Arterial Duplex  sig Lt PA ecatsia 1.7cm no evid of mural thrombus\par \par 4/28/2015 AID AA 1.8cm \par \par 10/06/2015  lle duplex sig for pop art ectasia 1.6cm\par \par 3/4/2016 ZENIA/PVR rle mild to mod infragenic art insuff lle no sig art occ dz sig for vessel calcification rt zenia 1.09 lt zenia 1.13\par \par 3/4/2016 LLE Arterial duplex RENETTA sig 1.8cm diam \par \par 10/21/2016 RLE Art Duplex patent  fem pop byp \par \par 4/19/2017 ZENIA/PVR RLE mild inframall art insuff  lle no sig art insuff rt zenia 1.07 lt eznia 1.22\par \par 4/19/2017 LLE Arterial Doppler  Lt Pop A robby 1.8cm \par \par 10/24/2017  RLE Arterial Duplex  patent fem pop byp  no flow restrictive lesions \par \par 2/28/2018 AID for  s/o AAA not performed awaiting  ins co approval\par \par 2/28/2018 Carotid duplex  tiffany ica less 50% stenosis  tiffany ant  va flow\par \par 5/25/2018 LLE Arterial Duplex Lt RENETTA 1.9cm \par \par 5/25/2018 ZENIA/PVR  RLE mild  infragenic art insuff lle no sig art occ dz  Rt ZENIA 1.09 lt zenia 1.20\par \par 6/27/2018 AID patent AA and tiffany iliac arterial systems w/o robby evidence \par \par 5/28/2019 ZENIA/PVR  RLE mild  infragenic art insuff lle no sig art occ dz  Rt ZENIA 1.04 lt zenia 1.13\par \par 5/28/2019 LLE Arterial Duplex Lt RENETTA 1.9cm \par \par 9/3/2019 RLE Arterial Duplex  patent SFA to BK  pop byp  no flow restrictive lesions \par \par 12/15/2020 ZENIA/PVR  RLE mild  infragenic art insuff lle no sig art occ dz  \par                                             Rt ZENIA 1.11 lt zenia 1.12\par \par 12/15/2020 RLE Arterial Duplex  patent SFA to BK  pop byp  no flow restrictive lesions \par \par 2/8/2021 Carotid Duplex  Rt ICA  less 50%  by velocity criteria\par                          Lt  ICA  less 50%  by velocity criteria\par                          Tiffany Ant Vertebral Arterial Flow \par \par

## 2021-03-04 ENCOUNTER — APPOINTMENT (OUTPATIENT)
Dept: RADIOLOGY | Facility: IMAGING CENTER | Age: 81
End: 2021-03-04
Payer: MEDICARE

## 2021-03-04 ENCOUNTER — RESULT REVIEW (OUTPATIENT)
Age: 81
End: 2021-03-04

## 2021-03-04 ENCOUNTER — OUTPATIENT (OUTPATIENT)
Dept: OUTPATIENT SERVICES | Facility: HOSPITAL | Age: 81
LOS: 1 days | End: 2021-03-04
Payer: MEDICARE

## 2021-03-04 DIAGNOSIS — R91.8 OTHER NONSPECIFIC ABNORMAL FINDING OF LUNG FIELD: ICD-10-CM

## 2021-03-04 PROCEDURE — 71046 X-RAY EXAM CHEST 2 VIEWS: CPT | Mod: 26

## 2021-03-04 PROCEDURE — 71046 X-RAY EXAM CHEST 2 VIEWS: CPT

## 2021-03-22 ENCOUNTER — RX RENEWAL (OUTPATIENT)
Age: 81
End: 2021-03-22

## 2021-03-24 ENCOUNTER — APPOINTMENT (OUTPATIENT)
Dept: PULMONOLOGY | Facility: CLINIC | Age: 81
End: 2021-03-24
Payer: MEDICARE

## 2021-03-24 VITALS
HEART RATE: 78 BPM | TEMPERATURE: 97.4 F | SYSTOLIC BLOOD PRESSURE: 153 MMHG | OXYGEN SATURATION: 94 % | DIASTOLIC BLOOD PRESSURE: 83 MMHG

## 2021-03-24 PROCEDURE — 99214 OFFICE O/P EST MOD 30 MIN: CPT

## 2021-03-24 PROCEDURE — 99072 ADDL SUPL MATRL&STAF TM PHE: CPT

## 2021-03-24 RX ORDER — FENOFIBRIC ACID 45 MG/1
45 CAPSULE, DELAYED RELEASE ORAL
Qty: 90 | Refills: 0 | Status: DISCONTINUED | COMMUNITY
Start: 2017-02-27 | End: 2021-03-24

## 2021-03-24 RX ORDER — TIOTROPIUM BROMIDE 18 UG/1
18 CAPSULE ORAL; RESPIRATORY (INHALATION)
Qty: 3 | Refills: 1 | Status: DISCONTINUED | COMMUNITY
Start: 2019-10-11 | End: 2021-03-24

## 2021-03-24 RX ORDER — BUDESONIDE AND FORMOTEROL FUMARATE DIHYDRATE 160; 4.5 UG/1; UG/1
160-4.5 AEROSOL RESPIRATORY (INHALATION) TWICE DAILY
Qty: 3 | Refills: 1 | Status: DISCONTINUED | COMMUNITY
Start: 2019-10-11 | End: 2021-03-24

## 2021-03-24 NOTE — DISCUSSION/SUMMARY
[FreeTextEntry1] : Chronic obstructive pulmonary disease.  Symptoms improved with bronchodilator therapy.\par Pulmonary nodules.  1 groundglass nodule.  Due for follow-up CAT scan.  Cannot be seen on routine radiograph.\par Overweight.\par Diabetes mellitus.\par

## 2021-03-24 NOTE — PROCEDURE
[FreeTextEntry1] : \par \par EXAM: XR CHEST PA LAT 2V\par \par \par PROCEDURE DATE: 03/04/2021\par \par \par \par INTERPRETATION: PA and lateral chest radiographs\par \par COMPARISON: Chest x-ray 10/8/2018 and CT chest 9/24/2019..\par \par CLINICAL INFORMATION: Pulmonary nodules..\par \par FINDINGS:\par \par The airway is midline.\par There are no airspace consolidations.\par There is no pleural effusion or pneumothorax.\par The heart size is within the limits of normal.\par The visualized osseus structures are intact.\par \par IMPRESSION:\par \par No acute radiographic cardiopulmonary pathology.\par \par Note, the 7 mm RIGHT middle lobe groundglass opacity/nodule described on prior CT scan 9/24/2019, not evident on plain film radiography.\par \par \par \par \par \par \par KERRI BISHOP MD; Attending Radiologist\par This document has been electronically signed. Mar 5 2021 1:33PM\par \par EXAM: CT CHEST \par \par \par PROCEDURE DATE: 09/24/2019 \par \par \par \par INTERPRETATION: EXAMINATION: CT CHEST \par \par CLINICAL INDICATION: Dyspnea, lung nodules. \par \par TECHNIQUE: Noncontrast CT of the chest was obtained. \par \par COMPARISON: 8/23/2017. \par \par FINDINGS: \par \par AIRWAYS AND LUNGS: The central tracheobronchial tree is patent. Emphysema. \par Right middle and lower lobe clustered nodules are new from the prior study.. \par Unchanged right upper lobe 7 mm groundglass nodule (2, 51). \par \par MEDIASTINUM AND PLEURA: There are no enlarged mediastinal, hilar or axillary \par lymph nodes. The visualized portion of the thyroid gland is unremarkable. \par There is no pleural effusion. There is no pneumothorax. \par \par HEART AND VESSELS: The heart is normal in size. There are atherosclerotic \par calcifications of the aorta and coronary arteries. There is no pericardial \par effusion. \par \par UPPER ABDOMEN: Images of the upper abdomen demonstrate cholelithiasis. \par \par BONES AND SOFT TISSUES: There are mild degenerative changes of the spine. \par The soft tissues are unremarkable. \par \par TUBES/LINES: None. \par \par IMPRESSION: \par Emphysema. Unchanged right middle lobe 7 mm groundglass nodule. Right middle \par and lower lobe clustered nodules are new from the prior study and likely \par represent mucoid impaction. Six-month follow-up CT recommended for complete \par evaluation. \par \par \par \par \par IVETTE NAYAK M.D., ATTENDING RADIOLOGIST \par This document has been electronically signed. Sep 25 2019 9:59AM

## 2021-03-24 NOTE — HISTORY OF PRESENT ILLNESS
[Former] : former [TextBox_4] : \par \par \par \par Feeling better. Some decrease in SOB.\par Using Symbicort and Spiriva.\par Got COVID vaccine\par Denies significant cough wheezing or chest congestion. [TextBox_11] : 2 1/2 [TextBox_13] : 42 [YearQuit] : 1990

## 2021-04-16 ENCOUNTER — APPOINTMENT (OUTPATIENT)
Dept: CT IMAGING | Facility: IMAGING CENTER | Age: 81
End: 2021-04-16
Payer: MEDICARE

## 2021-04-16 ENCOUNTER — OUTPATIENT (OUTPATIENT)
Dept: OUTPATIENT SERVICES | Facility: HOSPITAL | Age: 81
LOS: 1 days | End: 2021-04-16
Payer: MEDICARE

## 2021-04-16 DIAGNOSIS — Z00.8 ENCOUNTER FOR OTHER GENERAL EXAMINATION: ICD-10-CM

## 2021-04-16 DIAGNOSIS — R93.89 ABNORMAL FINDINGS ON DIAGNOSTIC IMAGING OF OTHER SPECIFIED BODY STRUCTURES: ICD-10-CM

## 2021-04-16 PROCEDURE — 71250 CT THORAX DX C-: CPT | Mod: 26

## 2021-04-16 PROCEDURE — 71250 CT THORAX DX C-: CPT

## 2021-04-26 ENCOUNTER — RX RENEWAL (OUTPATIENT)
Age: 81
End: 2021-04-26

## 2021-06-08 ENCOUNTER — APPOINTMENT (OUTPATIENT)
Dept: VASCULAR SURGERY | Facility: CLINIC | Age: 81
End: 2021-06-08
Payer: MEDICARE

## 2021-06-08 VITALS
SYSTOLIC BLOOD PRESSURE: 147 MMHG | WEIGHT: 243 LBS | HEIGHT: 66 IN | HEART RATE: 78 BPM | BODY MASS INDEX: 39.05 KG/M2 | TEMPERATURE: 97.9 F | DIASTOLIC BLOOD PRESSURE: 76 MMHG

## 2021-06-08 DIAGNOSIS — I70.8 ATHEROSCLEROSIS OF AORTA: ICD-10-CM

## 2021-06-08 DIAGNOSIS — I70.0 ATHEROSCLEROSIS OF AORTA: ICD-10-CM

## 2021-06-08 PROCEDURE — 99072 ADDL SUPL MATRL&STAF TM PHE: CPT

## 2021-06-08 PROCEDURE — 99214 OFFICE O/P EST MOD 30 MIN: CPT

## 2021-06-08 PROCEDURE — 93978 VASCULAR STUDY: CPT

## 2021-06-08 PROCEDURE — 93926 LOWER EXTREMITY STUDY: CPT

## 2021-06-08 PROCEDURE — 93978 VASCULAR STUDY: CPT | Mod: RT

## 2021-06-08 NOTE — ASSESSMENT
[Arterial/Venous Disease] : arterial/venous disease [Medication Management] : medication management [Foot care/Footwear] : foot care/footwear [FreeTextEntry1] : Impression stable lt rosa and art insuff stable, patent rle bypass and carotid stenosis\par \par [employer] management exercise program, protective measures\par Continue Plavix 75 qd and pletal 50 bid \par Ov w zenia/pvr s/o art insuff and lle art duplex s/o ROSA 12mo dec /2021\par ov w AID s/o AAA (pt has smoking hx) 5years june 2026\par ov and rle arterial duplex s/o bypass stenosis 12 mo  june 2022\par carotid duplex s/o stenosis 2 years feb 2023 \par \par \par \par \par \par

## 2021-06-08 NOTE — HISTORY OF PRESENT ILLNESS
[FreeTextEntry1] : pt  has ongoing tiffany  rt foot slightly worse discomfort intensity very  mild and unchanged since last ov \par pt is on plavix 75 qd and pletal 50 bid \par pt states that he received carotid duplex  by his cardiologist sev mo ago\par pt did not bring this for review \par \par  [de-identified] : pt reports tiffany le and foot  mild intermittent discomfort \par overall unchanged since last ov\par pt is on plavix 75 mf daily\par pt is on pletal 50 bid \par pt states no le wounds \par pt remains concerned re  his smoking hx and possibility of AAA

## 2021-06-08 NOTE — PHYSICAL EXAM
[Right Carotid Bruit] : right carotid bruit heard [Left Carotid Bruit] : left carotid bruit heard [1+] : right 1+ [2+] : left 2+ [Ankle Swelling (On Exam)] : present [Ankle Swelling Bilaterally] : bilaterally  [Varicose Veins Of Lower Extremities] : bilaterally [] : bilaterally [Ankle Swelling On The Right] : mild [No HSM] : no hepatosplenomegaly [No Rash or Lesion] : No rash or lesion [Alert] : alert [Oriented to Person] : oriented to person [Oriented to Place] : oriented to place [Oriented to Time] : oriented to time [Calm] : calm [JVD] : no jugular venous distention  [Abdomen Masses] : No abdominal masses [Tender] : was nontender [Stool Sample Taken] : No stool obtained  on rectal exam [de-identified] : nad [de-identified] : wnl [de-identified] : tiffany rales  [FreeTextEntry1] : mild art insuff w mild trophic skin changes\par mod venous insuff w mod st dermatitis and mild edema \par no wounds  [de-identified] : wnl [de-identified] : wnl [de-identified] : cooperative  [de-identified] : cn 2-12 tiffany grossly intact

## 2021-06-18 ENCOUNTER — APPOINTMENT (OUTPATIENT)
Dept: PULMONOLOGY | Facility: CLINIC | Age: 81
End: 2021-06-18

## 2021-06-23 ENCOUNTER — APPOINTMENT (OUTPATIENT)
Dept: PULMONOLOGY | Facility: CLINIC | Age: 81
End: 2021-06-23
Payer: MEDICARE

## 2021-06-23 VITALS
HEART RATE: 66 BPM | SYSTOLIC BLOOD PRESSURE: 187 MMHG | OXYGEN SATURATION: 97 % | DIASTOLIC BLOOD PRESSURE: 82 MMHG | TEMPERATURE: 97.9 F

## 2021-06-23 DIAGNOSIS — Z77.090 CONTACT WITH AND (SUSPECTED) EXPOSURE TO ASBESTOS: ICD-10-CM

## 2021-06-23 LAB — POCT - HEMOGLOBIN (HGB), QUANTITATIVE, TRANSCUTANEOUS: 15.2

## 2021-06-23 PROCEDURE — 99214 OFFICE O/P EST MOD 30 MIN: CPT | Mod: 25

## 2021-06-23 PROCEDURE — 94010 BREATHING CAPACITY TEST: CPT

## 2021-06-23 PROCEDURE — 94727 GAS DIL/WSHOT DETER LNG VOL: CPT

## 2021-06-23 PROCEDURE — 88738 HGB QUANT TRANSCUTANEOUS: CPT

## 2021-06-23 PROCEDURE — ZZZZZ: CPT

## 2021-06-23 PROCEDURE — 94729 DIFFUSING CAPACITY: CPT

## 2021-06-23 PROCEDURE — 99072 ADDL SUPL MATRL&STAF TM PHE: CPT

## 2021-06-23 RX ORDER — MONTELUKAST 10 MG/1
10 TABLET, FILM COATED ORAL DAILY
Qty: 30 | Refills: 0 | Status: DISCONTINUED | COMMUNITY
Start: 2020-12-15 | End: 2021-06-23

## 2021-06-23 RX ORDER — MONTELUKAST 10 MG/1
10 TABLET, FILM COATED ORAL
Qty: 90 | Refills: 3 | Status: DISCONTINUED | COMMUNITY
Start: 2021-03-24 | End: 2021-06-23

## 2021-06-23 NOTE — HISTORY OF PRESENT ILLNESS
[Former] : former [TextBox_4] : \par \par Feeling better. Some decrease in SOB.\par Using Symbicort and Spiriva.\par Got COVID vaccine\par Denies significant cough wheezing or chest congestion.\par Some phlegm feels from PN Drip. \par Had CT Chest.  [TextBox_11] : 2 1/2 [TextBox_13] : 42 [YearQuit] : 1990

## 2021-06-23 NOTE — ASSESSMENT
[FreeTextEntry1] : \par Trial off Montelukast\par Follow-up CAT scan in 12 to 18 months.\par Continue present bronchodilator therapy\par

## 2021-06-23 NOTE — DISCUSSION/SUMMARY
[FreeTextEntry1] : Chronic obstructive pulmonary disease.  Symptoms improved with bronchodilator therapy.  Stable function.\par Pulmonary nodules.  1 groundglass nodule. \par Overweight.\par Diabetes mellitus.\par

## 2021-06-23 NOTE — PROCEDURE
[FreeTextEntry1] : 06/23/2021\par Pulmonary function testing\par These data demonstrate a mild-moderate obstructive ventilatory deficit. There is elevation in the RV/TLC ratio indicative of possible air trapping. Diffusion capacity is normal. \par \par EXAM: XR CHEST PA LAT 2V\par \par \par PROCEDURE DATE: 03/04/2021\par \par \par \par INTERPRETATION: PA and lateral chest radiographs\par \par COMPARISON: Chest x-ray 10/8/2018 and CT chest 9/24/2019..\par \par CLINICAL INFORMATION: Pulmonary nodules..\par \par FINDINGS:\par \par The airway is midline.\par There are no airspace consolidations.\par There is no pleural effusion or pneumothorax.\par The heart size is within the limits of normal.\par The visualized osseus structures are intact.\par \par IMPRESSION:\par \par No acute radiographic cardiopulmonary pathology.\par \par Note, the 7 mm RIGHT middle lobe groundglass opacity/nodule described on prior CT scan 9/24/2019, not evident on plain film radiography.\par \par \par \par \par \par \par KERRI BISHOP MD; Attending Radiologist\par This document has been electronically signed. Mar 5 2021 1:33PM\par \par EXAM: CT CHEST \par \par \par PROCEDURE DATE: 09/24/2019 \par \par \par \par INTERPRETATION: EXAMINATION: CT CHEST \par \par CLINICAL INDICATION: Dyspnea, lung nodules. \par \par TECHNIQUE: Noncontrast CT of the chest was obtained. \par \par COMPARISON: 8/23/2017. \par \par FINDINGS: \par \par AIRWAYS AND LUNGS: The central tracheobronchial tree is patent. Emphysema. \par Right middle and lower lobe clustered nodules are new from the prior study.. \par Unchanged right upper lobe 7 mm groundglass nodule (2, 51). \par \par MEDIASTINUM AND PLEURA: There are no enlarged mediastinal, hilar or axillary \par lymph nodes. The visualized portion of the thyroid gland is unremarkable. \par There is no pleural effusion. There is no pneumothorax. \par \par HEART AND VESSELS: The heart is normal in size. There are atherosclerotic \par calcifications of the aorta and coronary arteries. There is no pericardial \par effusion. \par \par UPPER ABDOMEN: Images of the upper abdomen demonstrate cholelithiasis. \par \par BONES AND SOFT TISSUES: There are mild degenerative changes of the spine. \par The soft tissues are unremarkable. \par \par TUBES/LINES: None. \par \par IMPRESSION: \par Emphysema. Unchanged right middle lobe 7 mm groundglass nodule. Right middle \par and lower lobe clustered nodules are new from the prior study and likely \par represent mucoid impaction. Six-month follow-up CT recommended for complete \par evaluation. \par \par \par \par \par IVETTE NAYAK M.D., ATTENDING RADIOLOGIST \par This document has been electronically signed. Sep 25 2019 9:59AM

## 2021-12-07 ENCOUNTER — APPOINTMENT (OUTPATIENT)
Dept: VASCULAR SURGERY | Facility: CLINIC | Age: 81
End: 2021-12-07
Payer: MEDICARE

## 2021-12-07 VITALS
TEMPERATURE: 98.2 F | DIASTOLIC BLOOD PRESSURE: 76 MMHG | SYSTOLIC BLOOD PRESSURE: 119 MMHG | WEIGHT: 262 LBS | BODY MASS INDEX: 42.11 KG/M2 | HEART RATE: 81 BPM | HEIGHT: 66 IN

## 2021-12-07 PROCEDURE — 99214 OFFICE O/P EST MOD 30 MIN: CPT

## 2021-12-07 PROCEDURE — 93926 LOWER EXTREMITY STUDY: CPT | Mod: RT

## 2021-12-07 PROCEDURE — 93923 UPR/LXTR ART STDY 3+ LVLS: CPT

## 2021-12-07 RX ORDER — LOSARTAN POTASSIUM 50 MG/1
50 TABLET, FILM COATED ORAL
Qty: 30 | Refills: 0 | Status: ACTIVE | COMMUNITY
Start: 2021-06-23

## 2021-12-07 RX ORDER — LOSARTAN POTASSIUM AND HYDROCHLOROTHIAZIDE 12.5; 1 MG/1; MG/1
TABLET ORAL
Refills: 0 | Status: ACTIVE | COMMUNITY

## 2021-12-07 RX ORDER — LOSARTAN POTASSIUM 100 MG/1
100 TABLET, FILM COATED ORAL
Qty: 90 | Refills: 0 | Status: ACTIVE | COMMUNITY
Start: 2021-11-24

## 2021-12-07 NOTE — HISTORY OF PRESENT ILLNESS
[FreeTextEntry1] : pt  has ongoing tiffany  rt foot slightly worse discomfort intensity very  mild and unchanged since last ov \par pt is on plavix 75 qd and pletal 50 bid \par pt states that he received carotid duplex  by his cardiologist sev mo ago\par pt did not bring this for review \par \par  [de-identified] : pt reports tiffany le and foot  mild intermittent discomfort \par overall unchanged since last ov\par pt is on plavix 75 mf daily\par pt is on pletal 50 bid \par pt states no le wounds \par pt remains concerned re  his smoking hx and possibility of AAA

## 2021-12-07 NOTE — DATA REVIEWED
[FreeTextEntry1] : 01/09/2013 Arterial Duplex  rle bypass patent\par \par 01/09/2013 AID aa not seen due to gas\par \par 02/26/2013 ZENIA/PVR no sig art occ dz (sig improvement in rle perfusion pressures) rt zenia 1.06 lt zenia 1.08\par \par 2/25/2014 ZENIA/PVR no sig art occ dz rt zenia 1.15 lt zenia 1.23\par \par 2/25/2014 Arterial Duplex RLE bypass patent LLE no renetta\par \par 2/24/2015 ZENIA/PVR mod infragenic art occ dz tiffany le w vessel calcification  rt zenia 1.21 lt zenia 1.2\par \par 2/24/2015 RLE Art Duplex patent sfa to bk pop bypass\par \par 4/7/2015  LLE Arterial Duplex  sig Lt PA ecatsia 1.7cm no evid of mural thrombus\par \par 4/28/2015 AID AA 1.8cm \par \par 10/06/2015  lle duplex sig for pop art ectasia 1.6cm\par \par 3/4/2016 ZENIA/PVR rle mild to mod infragenic art insuff lle no sig art occ dz sig for vessel calcification rt zenia 1.09 lt zenia 1.13\par \par 3/4/2016 LLE Arterial duplex RENETTA sig 1.8cm diam \par \par 10/21/2016 RLE Art Duplex patent  fem pop byp \par \par 4/19/2017 ZENIA/PVR RLE mild inframall art insuff  lle no sig art insuff rt zenia 1.07 lt zenia 1.22\par \par 4/19/2017 LLE Arterial Doppler  Lt Pop A robby 1.8cm \par \par 10/24/2017  RLE Arterial Duplex  patent fem pop byp  no flow restrictive lesions \par \par 2/28/2018 AID for  s/o AAA not performed awaiting  ins co approval\par \par 2/28/2018 Carotid duplex  tiffany ica less 50% stenosis  tiffany ant  va flow\par \par 5/25/2018 LLE Arterial Duplex Lt RENETTA 1.9cm \par \par 5/25/2018 ZENIA/PVR  RLE mild  infragenic art insuff lle no sig art occ dz  Rt ZENIA 1.09 lt zenia 1.20\par \par 6/27/2018 AID patent AA and tiffany iliac arterial systems w/o robby evidence \par \par 5/28/2019 ZENIA/PVR  RLE mild  infragenic art insuff lle no sig art occ dz  Rt ZENIA 1.04 lt zenia 1.13\par \par 5/28/2019 LLE Arterial Duplex Lt RENETTA 1.9cm \par \par 9/3/2019 RLE Arterial Duplex  patent SFA to BK  pop byp  no flow restrictive lesions \par \par 12/15/2020 ZENIA/PVR  RLE mild  infragenic art insuff lle no sig art occ dz  \par                                             Rt ZENIA 1.11 lt zenia 1.12\par \par 12/15/2020 RLE Arterial Duplex  patent SFA to BK  pop byp  no flow restrictive lesions \par \par 2/8/2021 Carotid Duplex  Rt ICA  less 50%  by velocity criteria\par                          Lt  ICA  less 50%  by velocity criteria\par                          Tiffany Ant Vertebral Arterial Flow \par \par 6/8/2021 RLE Arterial Duplex  patent SFA to BK  pop byp  no flow restrictive lesions \par \par 6/8/2021  AID patent AA and tiffany iliac arterial systems w/o robby evidence \par \par 12/7/2021  RLE Arterial Duplex  patent SFA to BK  pop byp  no flow restrictive lesions \par \par 12/7/2021 ZENIA/PVR  RLE mild  infragenic art insuff lle no sig art occ dz  \par                                             Rt ZENIA 1.27 lt zenia 1.16\par \par \par

## 2021-12-07 NOTE — ASSESSMENT
[Arterial/Venous Disease] : arterial/venous disease [Medication Management] : medication management [Foot care/Footwear] : foot care/footwear [FreeTextEntry1] : Impression stable lt rosa and art insuff stable, patent rle bypass and carotid stenosis\par \par [facility] management exercise program, protective measures\par Continue Plavix 75 qd and pletal 50 bid \par ov w AID s/o AAA (pt has smoking hx) 5years june 2026\par carotid duplex s/o stenosis 2 years feb 2023 \par ov and lle arterial duplex s/o ROSA 12 mo  june 2022\par Ov w zenia/pvr s/o art insuff and RLE art duplex s/o bypass stenosis  12mo dec /2022\par \par \par \par \par

## 2021-12-07 NOTE — PHYSICAL EXAM
[Right Carotid Bruit] : right carotid bruit heard [Left Carotid Bruit] : left carotid bruit heard [1+] : right 1+ [2+] : left 2+ [Ankle Swelling (On Exam)] : present [Ankle Swelling Bilaterally] : bilaterally  [Varicose Veins Of Lower Extremities] : bilaterally [] : bilaterally [Ankle Swelling On The Right] : mild [No HSM] : no hepatosplenomegaly [No Rash or Lesion] : No rash or lesion [Alert] : alert [Oriented to Person] : oriented to person [Oriented to Place] : oriented to place [Oriented to Time] : oriented to time [Calm] : calm [JVD] : no jugular venous distention  [Abdomen Masses] : No abdominal masses [Tender] : was nontender [Stool Sample Taken] : No stool obtained  on rectal exam [de-identified] : nad [de-identified] : wnl [de-identified] : tiffany rales  [FreeTextEntry1] : mild art insuff w mild trophic skin changes\par mod venous insuff w mod st dermatitis and mild edema \par no wounds  [de-identified] : wnl [de-identified] : cn 2-12 tiffany grossly intact [de-identified] : cooperative

## 2022-01-26 ENCOUNTER — APPOINTMENT (OUTPATIENT)
Dept: PULMONOLOGY | Facility: CLINIC | Age: 82
End: 2022-01-26
Payer: MEDICARE

## 2022-01-26 VITALS
BODY MASS INDEX: 38.73 KG/M2 | SYSTOLIC BLOOD PRESSURE: 147 MMHG | WEIGHT: 241 LBS | HEART RATE: 70 BPM | HEIGHT: 66 IN | DIASTOLIC BLOOD PRESSURE: 83 MMHG | TEMPERATURE: 95.6 F | OXYGEN SATURATION: 95 %

## 2022-01-26 DIAGNOSIS — Z20.822 CONTACT WITH AND (SUSPECTED) EXPOSURE TO COVID-19: ICD-10-CM

## 2022-01-26 PROCEDURE — 99213 OFFICE O/P EST LOW 20 MIN: CPT | Mod: 25

## 2022-01-26 PROCEDURE — 87811 SARS-COV-2 COVID19 W/OPTIC: CPT

## 2022-01-27 NOTE — HISTORY OF PRESENT ILLNESS
[Former] : former [TextBox_4] : \par \par Using Symbicort and Spiriva.\par Got COVID vaccine booster\par Denies significant cough wheezing or chest congestion.\par Some phlegm feels from [place] Drip. \par \par Not active.\par Predominantly in home.\par Not walking much. \par  [TextBox_11] : 2 1/2 [TextBox_13] : 40 [YearQuit] : 1990

## 2022-01-27 NOTE — ASSESSMENT
[FreeTextEntry1] : \par Trial off Montelukast\par Follow-up CAT scan in 12 to 18 months from prior.\par Continue present bronchodilator therapy\par F/U 4 months/PFT\par

## 2022-01-27 NOTE — DISCUSSION/SUMMARY
[FreeTextEntry1] : Chronic obstructive pulmonary disease.  Symptoms improved with bronchodilator therapy.  Stable \par Pulmonary nodules.  1 groundglass nodule. \par Overweight.\par Diabetes mellitus.\par

## 2022-01-28 LAB
COVID-19 SPIKE DOMAIN ANTIBODY INTERPRETATION: POSITIVE
SARS-COV-2 AB SERPL IA-ACNC: >250 U/ML

## 2022-02-02 RX ORDER — CLOPIDOGREL BISULFATE 75 MG/1
75 TABLET, FILM COATED ORAL DAILY
Qty: 90 | Refills: 3 | Status: ACTIVE | COMMUNITY
Start: 1900-01-01 | End: 1900-01-01

## 2022-03-07 NOTE — ASSESSMENT
[FreeTextEntry1] : Consider pulmonary rehab.  After pandemic. \par Begin exercise program with walking at this time.\par Weight loss recommended and discussed.\par Continue Spiriva and Symbicort\par PRN Beta Agonist.\par For pulmonary function testing.\par Follow-up CAT scan of the chest.\par Follow-up in 3 months or sooner on a as needed basis.\par  No assistance needed

## 2022-06-13 ENCOUNTER — APPOINTMENT (OUTPATIENT)
Dept: VASCULAR SURGERY | Facility: CLINIC | Age: 82
End: 2022-06-13

## 2022-06-20 ENCOUNTER — APPOINTMENT (OUTPATIENT)
Dept: PULMONOLOGY | Facility: CLINIC | Age: 82
End: 2022-06-20

## 2022-06-22 ENCOUNTER — APPOINTMENT (OUTPATIENT)
Dept: PULMONOLOGY | Facility: CLINIC | Age: 82
End: 2022-06-22
Payer: MEDICARE

## 2022-06-22 VITALS — OXYGEN SATURATION: 96 % | SYSTOLIC BLOOD PRESSURE: 165 MMHG | HEART RATE: 68 BPM | DIASTOLIC BLOOD PRESSURE: 83 MMHG

## 2022-06-22 DIAGNOSIS — R91.8 OTHER NONSPECIFIC ABNORMAL FINDING OF LUNG FIELD: ICD-10-CM

## 2022-06-22 LAB — POCT - HEMOGLOBIN (HGB), QUANTITATIVE, TRANSCUTANEOUS: 15.6

## 2022-06-22 PROCEDURE — ZZZZZ: CPT

## 2022-06-22 PROCEDURE — 94729 DIFFUSING CAPACITY: CPT

## 2022-06-22 PROCEDURE — 94727 GAS DIL/WSHOT DETER LNG VOL: CPT

## 2022-06-22 PROCEDURE — 94060 EVALUATION OF WHEEZING: CPT

## 2022-06-22 PROCEDURE — 99213 OFFICE O/P EST LOW 20 MIN: CPT | Mod: 25

## 2022-06-22 PROCEDURE — 88738 HGB QUANT TRANSCUTANEOUS: CPT

## 2022-06-22 RX ORDER — FENOFIBRATE 145 MG/1
145 TABLET, COATED ORAL
Qty: 90 | Refills: 0 | Status: ACTIVE | COMMUNITY
Start: 2022-03-30

## 2022-06-23 PROBLEM — R91.8 GROUND GLASS OPACITY PRESENT ON IMAGING OF LUNG: Status: ACTIVE | Noted: 2021-03-24

## 2022-06-23 NOTE — PROCEDURE
[FreeTextEntry1] : 06/22/2022\par Pulmonary function testing\par These data demonstrate a moderate obstructive ventilatory deficit. There is a significant bronchodilator response There is evidence of mild hyperinflation. There is elevation in the RV/TLC ratio indicative of possible air trapping. Diffusion capacity is normal. \par \par

## 2022-06-23 NOTE — DISCUSSION/SUMMARY
[FreeTextEntry1] : Chronic obstructive pulmonary disease.  Symptoms improved with bronchodilator therapy.  Stable \par Pulmonary nodules.  1 groundglass nodule.  Radiographically stable.  Last CT April 2021.\par Overweight.\par Diabetes mellitus.\par

## 2022-06-23 NOTE — HISTORY OF PRESENT ILLNESS
[Former] : former [TextBox_4] : \par \par Using Symbicort and Spiriva.\par ODOM 1 flight.\par After eats dinner notes phlegm in chest. \par Some phlegm feels from PN Drip. Went to ENT had nasal spray with positive response but stopped.\par Chronic upper airway congestion. \par Not active.\par Predominantly in home.\par Not walking much. \par  [TextBox_11] : 2 1/2 [TextBox_13] : 40 [YearQuit] : 1990

## 2022-06-23 NOTE — PHYSICAL EXAM
[No Acute Distress] : no acute distress [Supple] : supple [No JVD] : no jvd [Normal S1, S2] : normal s1, s2 [No Murmurs] : no murmurs [Clear to Auscultation Bilaterally] : clear to auscultation bilaterally [Normal to Percussion] : normal to percussion [Benign] : benign [Not Tender] : not tender [No HSM] : no hsm [No Clubbing] : no clubbing [No Cyanosis] : no cyanosis [No Edema] : no edema [Oriented x3] : oriented x3 [TextBox_54] : S/p median sternotomy.

## 2022-06-23 NOTE — ASSESSMENT
[FreeTextEntry1] : \par Trial of Mucinex and restart nasal sprays. \par Continue present bronchodilator therapy\par Follow-up CT 10/22

## 2022-06-28 ENCOUNTER — APPOINTMENT (OUTPATIENT)
Dept: VASCULAR SURGERY | Facility: CLINIC | Age: 82
End: 2022-06-28
Payer: MEDICARE

## 2022-06-28 VITALS
BODY MASS INDEX: 35.07 KG/M2 | WEIGHT: 245 LBS | SYSTOLIC BLOOD PRESSURE: 165 MMHG | HEART RATE: 72 BPM | TEMPERATURE: 97.3 F | DIASTOLIC BLOOD PRESSURE: 77 MMHG | HEIGHT: 70 IN

## 2022-06-28 DIAGNOSIS — I72.4 ANEURYSM OF ARTERY OF LOWER EXTREMITY: ICD-10-CM

## 2022-06-28 DIAGNOSIS — M79.604 PAIN IN RIGHT LEG: ICD-10-CM

## 2022-06-28 DIAGNOSIS — M79.605 PAIN IN RIGHT LEG: ICD-10-CM

## 2022-06-28 PROCEDURE — 93923 UPR/LXTR ART STDY 3+ LVLS: CPT

## 2022-06-28 PROCEDURE — 99214 OFFICE O/P EST MOD 30 MIN: CPT

## 2022-06-28 PROCEDURE — 93926 LOWER EXTREMITY STUDY: CPT | Mod: RT

## 2022-06-28 RX ORDER — METOPROLOL TARTRATE 75 MG/1
TABLET, FILM COATED ORAL
Refills: 0 | Status: ACTIVE | COMMUNITY

## 2022-06-28 NOTE — REASON FOR VISIT
[Follow-Up: _____] : a [unfilled] follow-up visit [FreeTextEntry1] : i have poor leg circulation and my legs bother me

## 2022-06-28 NOTE — DATA REVIEWED
[FreeTextEntry1] : 01/09/2013 Arterial Duplex  rle bypass patent\par \par 01/09/2013 AID aa not seen due to gas\par \par 02/26/2013 ZENIA/PVR no sig art occ dz (sig improvement in rle perfusion pressures) rt zenia 1.06 lt zenia 1.08\par \par 2/25/2014 ZENIA/PVR no sig art occ dz rt zenia 1.15 lt zenia 1.23\par \par 2/25/2014 Arterial Duplex RLE bypass patent LLE no renetta\par \par 2/24/2015 ZENIA/PVR mod infragenic art occ dz tiffany le w vessel calcification  rt zenia 1.21 lt zenia 1.2\par \par 2/24/2015 RLE Art Duplex patent sfa to bk pop bypass\par \par 4/7/2015  LLE Arterial Duplex  sig Lt PA ecatsia 1.7cm no evid of mural thrombus\par \par 4/28/2015 AID AA 1.8cm \par \par 10/06/2015  lle duplex sig for pop art ectasia 1.6cm\par \par 3/4/2016 ZENIA/PVR rle mild to mod infragenic art insuff lle no sig art occ dz sig for vessel calcification rt zenia 1.09 lt zenia 1.13\par \par 3/4/2016 LLE Arterial duplex RENETTA sig 1.8cm diam \par \par 10/21/2016 RLE Art Duplex patent  fem pop byp \par \par 4/19/2017 ZENIA/PVR RLE mild inframall art insuff  lle no sig art insuff rt zenia 1.07 lt zenia 1.22\par \par 4/19/2017 LLE Arterial Doppler  Lt Pop A robby 1.8cm \par \par 10/24/2017  RLE Arterial Duplex  patent fem pop byp  no flow restrictive lesions \par \par 2/28/2018 AID for  s/o AAA not performed awaiting  ins co approval\par \par 2/28/2018 Carotid duplex  tiffany ica less 50% stenosis  tiffany ant  va flow\par \par 5/25/2018 LLE Arterial Duplex Lt RENETTA 1.9cm \par \par 5/25/2018 ZENIA/PVR  RLE mild  infragenic art insuff lle no sig art occ dz  Rt ZENIA 1.09 lt zenia 1.20\par \par 6/27/2018 AID patent AA and tiffany iliac arterial systems w/o robby evidence \par \par 5/28/2019 ZENIA/PVR  RLE mild  infragenic art insuff lle no sig art occ dz  Rt ZENIA 1.04 lt zenia 1.13\par \par 5/28/2019 LLE Arterial Duplex Lt RENETTA 1.9cm \par \par 9/3/2019 RLE Arterial Duplex  patent SFA to BK  pop byp  no flow restrictive lesions \par \par 12/15/2020 ZENIA/PVR  RLE mild  infragenic art insuff lle no sig art occ dz  \par                                             Rt ZENIA 1.11 lt zenia 1.12\par \par 12/15/2020 RLE Arterial Duplex  patent SFA to BK  pop byp  no flow restrictive lesions \par \par 2/8/2021 Carotid Duplex  Rt ICA  less 50%  by velocity criteria\par                          Lt  ICA  less 50%  by velocity criteria\par                          Tiffany Ant Vertebral Arterial Flow \par \par 6/8/2021 RLE Arterial Duplex  patent SFA to BK  pop byp  no flow restrictive lesions \par \par 6/8/2021  AID patent AA and tiffany iliac arterial systems w/o robby evidence \par \par 12/7/2021  RLE Arterial Duplex  patent SFA to BK  pop byp  no flow restrictive lesions \par \par 12/7/2021 ZENIA/PVR  RLE mild  infragenic art insuff lle no sig art occ dz  \par                                             Rt ZENIA 1.27 lt zenia 1.16\par \par 6/28/2022 RLE Arterial Duplex  patent SFA to BK  pop byp  no flow restrictive lesions \par \par \par 6/28/2022  ZENIA/PVR  RLE mild  infragenic art insuff lle no sig art occ dz  \par                                             Rt ZENIA 1.19 lt zenia 1.19\par \par \par

## 2022-06-28 NOTE — PHYSICAL EXAM
[1+] : right 1+ [Ankle Swelling (On Exam)] : present [Ankle Swelling Bilaterally] : bilaterally  [Varicose Veins Of Lower Extremities] : bilaterally [] : bilaterally [Ankle Swelling On The Right] : mild [No Rash or Lesion] : No rash or lesion [Alert] : alert [Oriented to Person] : oriented to person [Oriented to Place] : oriented to place [Oriented to Time] : oriented to time [Calm] : calm [JVD] : no jugular venous distention  [2+] : right 2+ [Tender] : was nontender [de-identified] : nad [de-identified] : wnl [de-identified] : tiffany rales  [FreeTextEntry1] : mild art insuff w mild trophic skin changes\par mod venous insuff w mod st dermatitis and mild edema \par no wounds  [de-identified] : wnl ambulates slowly [de-identified] : cn 2-12 tiffany grossly intact [de-identified] : cooperative

## 2022-06-28 NOTE — ASSESSMENT
[Arterial/Venous Disease] : arterial/venous disease [Medication Management] : medication management [Foot care/Footwear] : foot care/footwear [FreeTextEntry1] : Impression stable lt rosa and art insuff stable, patent rle bypass and carotid stenosis\par \par \par \par [facility] [employer] management exercise program, protective measures\par d/w pt  trial of  pletal to increase pletal 50 bid to 100 bid \par pt wants to hold off  and call if sx worsen \par Continue Plavix 75 qd and pletal 50 bid \par ov w AID s/o AAA (pt has smoking hx) 5years june 2026\par carotid duplex s/o stenosis 2 years feb 2023 \par ov and rle arterial duplex s/o ROSA 12 mo  june 2023\par Ov w LLE art duplex s/o bypass stenosis  12mo dec  2022\par \par \par \par \par

## 2022-06-28 NOTE — HISTORY OF PRESENT ILLNESS
[FreeTextEntry1] : pt  has ongoing tiffany  rt foot slightly worse discomfort intensity very  mild and unchanged since last ov \par pt is on plavix 75 qd and pletal 50 bid \par pt states that he received carotid duplex  by his cardiologist sev mo ago\par pt did not bring this for review \par \par  [de-identified] : pt reports tiffany le and foot  mild intermittent discomfort \par overall  slightly increased since last ov\par pt is on plavix 75 mf daily\par pt is on pletal 50 bid \par pt states no le wounds \par pt remains concerned re  his smoking hx and possibility of AAA

## 2022-08-22 NOTE — ED PROVIDER NOTE - OBSERVING MD:
SHAREE     MIRIAM: 10/28/2019    Presents today for Diabetic Eye Exam.  Pt reports noticeable vision changes.  Wear OTC readers +1.50 for dist and +4.50 for near.  Floaters, no flashes  No gtts    LBS:  Hemoglobin A1C       Date                     Value               Ref Range             Status                07/14/2022               5.9 (H)             4.0 - 5.6 %           Final                   01/03/2022               5.4                 4.8 - 5.9 %           Final                 12/21/2021               5.3                 4.0 - 5.6 %           Final                  Last edited by Jocelyn Burt MA on 8/22/2022  1:43 PM. (History)        ROS     Positive for: Endocrine (DM)    Negative for: Constitutional, Gastrointestinal, Neurological, Skin,   Genitourinary, Musculoskeletal, HENT, Cardiovascular, Eyes, Respiratory,   Psychiatric, Allergic/Imm, Heme/Lymph    Last edited by Ravi Kearns, KOFI on 8/22/2022  2:22 PM. (History)        Assessment /Plan     For exam results, see Encounter Report.    Type 2 diabetes mellitus without retinopathy    Nuclear sclerosis, bilateral    Glaucoma screening    Presbyopia      1. Cat OU w inc hyp.  Pt can use  otc +2.25 for dist, +5.00 for near, or I wrote bifocal Rx  2. DM- WITHOUT RETINOPATHY.  Advised yearly DFE       PLAN:    rtc 1 yr                 
Dr. Hansen

## 2022-12-06 ENCOUNTER — APPOINTMENT (OUTPATIENT)
Dept: VASCULAR SURGERY | Facility: CLINIC | Age: 82
End: 2022-12-06

## 2022-12-06 VITALS
HEIGHT: 70 IN | HEART RATE: 76 BPM | SYSTOLIC BLOOD PRESSURE: 121 MMHG | WEIGHT: 240 LBS | TEMPERATURE: 97.6 F | DIASTOLIC BLOOD PRESSURE: 72 MMHG | BODY MASS INDEX: 34.36 KG/M2

## 2022-12-06 PROCEDURE — 99214 OFFICE O/P EST MOD 30 MIN: CPT

## 2022-12-06 PROCEDURE — 93926 LOWER EXTREMITY STUDY: CPT | Mod: RT

## 2022-12-06 PROCEDURE — 93923 UPR/LXTR ART STDY 3+ LVLS: CPT

## 2022-12-06 RX ORDER — METFORMIN HYDROCHLORIDE 500 MG/1
500 TABLET, COATED ORAL TWICE DAILY
Qty: 180 | Refills: 3 | Status: ACTIVE | COMMUNITY
Start: 2022-12-06 | End: 1900-01-01

## 2022-12-06 NOTE — DATA REVIEWED
[FreeTextEntry1] : 01/09/2013 Arterial Duplex  rle bypass patent\par \par 01/09/2013 AID aa not seen due to gas\par \par 02/26/2013 ZENIA/PVR no sig art occ dz (sig improvement in rle perfusion pressures) rt zenia 1.06 lt zenia 1.08\par \par 2/25/2014 ZENIA/PVR no sig art occ dz rt zenia 1.15 lt zenia 1.23\par \par 2/25/2014 Arterial Duplex RLE bypass patent LLE no renetta\par \par 2/24/2015 ZENIA/PVR mod infragenic art occ dz tiffany le w vessel calcification  rt zenia 1.21 lt zenia 1.2\par \par 2/24/2015 RLE Art Duplex patent sfa to bk pop bypass\par \par 4/7/2015  LLE Arterial Duplex  sig Lt PA ecatsia 1.7cm no evid of mural thrombus\par \par 4/28/2015 AID AA 1.8cm \par \par 10/06/2015  lle duplex sig for pop art ectasia 1.6cm\par \par 3/4/2016 ZENIA/PVR rle mild to mod infragenic art insuff lle no sig art occ dz sig for vessel calcification rt zenia 1.09 lt zenia 1.13\par \par 3/4/2016 LLE Arterial duplex RENETTA sig 1.8cm diam \par \par 10/21/2016 RLE Art Duplex patent  fem pop byp \par \par 4/19/2017 ZENIA/PVR RLE mild inframall art insuff  lle no sig art insuff rt zenia 1.07 lt zenia 1.22\par \par 4/19/2017 LLE Arterial Doppler  Lt Pop A robby 1.8cm \par \par 10/24/2017  RLE Arterial Duplex  patent fem pop byp  no flow restrictive lesions \par \par 2/28/2018 AID for  s/o AAA not performed awaiting  ins co approval\par \par 2/28/2018 Carotid duplex  tiffany ica less 50% stenosis  tiffany ant  va flow\par \par 5/25/2018 LLE Arterial Duplex Lt RENTETA 1.9cm \par \par 5/25/2018 ZENIA/PVR  RLE mild  infragenic art insuff lle no sig art occ dz  Rt ZENIA 1.09 lt zenia 1.20\par \par 6/27/2018 AID patent AA and tiffany iliac arterial systems w/o robby evidence \par \par 5/28/2019 ZENIA/PVR  RLE mild  infragenic art insuff lle no sig art occ dz  Rt ZENIA 1.04 lt zenia 1.13\par \par 5/28/2019 LLE Arterial Duplex Lt RENETTA 1.9cm \par \par 9/3/2019 RLE Arterial Duplex  patent SFA to BK  pop byp  no flow restrictive lesions \par \par 12/15/2020 ZENIA/PVR  RLE mild  infragenic art insuff lle no sig art occ dz  \par                                             Rt ZENIA 1.11 lt zeina 1.12\par \par 12/15/2020 RLE Arterial Duplex  patent SFA to BK  pop byp  no flow restrictive lesions \par \par 2/8/2021 Carotid Duplex  Rt ICA  less 50%  by velocity criteria\par                          Lt  ICA  less 50%  by velocity criteria\par                          Tiffany Ant Vertebral Arterial Flow \par \par 6/8/2021 RLE Arterial Duplex  patent SFA to BK  pop byp  no flow restrictive lesions \par \par 6/8/2021  AID patent AA and tiffany iliac arterial systems w/o robby evidence \par \par 12/7/2021  RLE Arterial Duplex  patent SFA to BK  pop byp  no flow restrictive lesions \par \par 12/7/2021 ZENIA/PVR  RLE mild  infragenic art insuff lle no sig art occ dz  \par                                             Rt ZENIA 1.27 lt zenia 1.16\par \par 6/28/2022 RLE Arterial Duplex  patent SFA to BK  pop byp  no flow restrictive lesions \par \par \par 6/28/2022  ZENIA/PVR  RLE mild  infragenic art insuff lle no sig art occ dz  \par                                             Rt ZENIA 1.19 lt zenia 1.19\par \par \par 12/6/2022 ZENIA/PVR  RLE moderate   infragenic art insuff \par                                 LLE mild  art occ dz   w vessel calcification \par                                             Rt ZENIA 1.26 lt zenia 1.20\par \par 12/6/2022  RLE Arterial Duplex  patent SFA to BK  pop byp  no flow restrictive lesions \par                                       GORGE occlusion w DPA reconstitution\par \par \par \par

## 2022-12-06 NOTE — PHYSICAL EXAM
[1+] : right 1+ [2+] : left 2+ [Ankle Swelling (On Exam)] : present [Ankle Swelling Bilaterally] : bilaterally  [Varicose Veins Of Lower Extremities] : bilaterally [] : bilaterally [Ankle Swelling On The Right] : mild [No Rash or Lesion] : No rash or lesion [Alert] : alert [Oriented to Person] : oriented to person [Oriented to Place] : oriented to place [Oriented to Time] : oriented to time [Calm] : calm [JVD] : no jugular venous distention  [Tender] : was nontender [de-identified] : nad [de-identified] : wnl [de-identified] : no resp effort  [FreeTextEntry1] : mild art insuff w mild trophic skin changes\par mod venous insuff w mod st dermatitis and mild edema \par no wounds  [de-identified] : wnl ambulates slowly [de-identified] : cn 2-12 tiffany grossly intact [de-identified] : cooperative

## 2022-12-06 NOTE — HISTORY OF PRESENT ILLNESS
[FreeTextEntry1] : pt  has ongoing tiffany  rt foot slightly worse discomfort intensity very  mild and unchanged since last ov \par pt is on plavix 75 qd and pletal 50 bid \par pt states that he received carotid duplex  by his cardiologist sev mo ago\par pt did not bring this for review \par \par  [de-identified] : pt reports tiffany le and foot  mild intermittent discomfort \par overall  unchanged  since last ov\par pt also c/o tiffany right worse than left cold feet \par pt is on plavix 75 mf daily\par pt is on pletal 50 bid \par pt states no le wounds \par pt remains concerned re  his smoking hx and possibility of AAA

## 2022-12-06 NOTE — ASSESSMENT
[Arterial/Venous Disease] : arterial/venous disease [Medication Management] : medication management [Foot care/Footwear] : foot care/footwear [FreeTextEntry1] : Impression stable lt rosa and art insuff stable, patent rle bypass and carotid stenosis\par \par \par \par [employer] management exercise program, protective measures\par d/w pt  trial of  pletal to increase pletal 50 bid to 100 bid \par pt wants to hold off  and call if sx worsen \par Continue Plavix 75 qd\par continue pletal 50 bid \par ov w AID s/o AAA (pt has smoking hx) 5years june 2026\par carotid duplex s/o stenosis 2 years feb 2023  then telehealth \par ov and lle arterial duplex s/o ROSA 12 mo july 2023\par Ov w RLE art duplex s/o bypass stenosis and zenia/pvr s/o art insuff   12mo dec  2023\par \par \par \par \par

## 2022-12-28 RX ORDER — ALBUTEROL SULFATE 90 UG/1
108 (90 BASE) AEROSOL, METERED RESPIRATORY (INHALATION)
Qty: 3 | Refills: 1 | Status: ACTIVE | COMMUNITY
Start: 2018-04-24 | End: 1900-01-01

## 2022-12-28 RX ORDER — ALBUTEROL SULFATE 90 UG/1
108 (90 BASE) AEROSOL, METERED RESPIRATORY (INHALATION)
Qty: 3 | Refills: 3 | Status: ACTIVE | COMMUNITY
Start: 2022-12-28 | End: 1900-01-01

## 2023-02-01 ENCOUNTER — APPOINTMENT (OUTPATIENT)
Dept: PULMONOLOGY | Facility: CLINIC | Age: 83
End: 2023-02-01

## 2023-02-08 ENCOUNTER — APPOINTMENT (OUTPATIENT)
Dept: VASCULAR SURGERY | Facility: CLINIC | Age: 83
End: 2023-02-08

## 2023-02-15 ENCOUNTER — APPOINTMENT (OUTPATIENT)
Dept: VASCULAR SURGERY | Facility: CLINIC | Age: 83
End: 2023-02-15

## 2023-04-12 ENCOUNTER — APPOINTMENT (OUTPATIENT)
Dept: PULMONOLOGY | Facility: CLINIC | Age: 83
End: 2023-04-12
Payer: MEDICARE

## 2023-04-12 VITALS
TEMPERATURE: 97 F | HEART RATE: 81 BPM | WEIGHT: 237 LBS | BODY MASS INDEX: 33.93 KG/M2 | SYSTOLIC BLOOD PRESSURE: 156 MMHG | DIASTOLIC BLOOD PRESSURE: 77 MMHG | HEIGHT: 70 IN | OXYGEN SATURATION: 97 %

## 2023-04-12 DIAGNOSIS — Z87.898 PERSONAL HISTORY OF OTHER SPECIFIED CONDITIONS: ICD-10-CM

## 2023-04-12 DIAGNOSIS — R91.8 OTHER NONSPECIFIC ABNORMAL FINDING OF LUNG FIELD: ICD-10-CM

## 2023-04-12 PROCEDURE — 94010 BREATHING CAPACITY TEST: CPT

## 2023-04-12 PROCEDURE — 99214 OFFICE O/P EST MOD 30 MIN: CPT | Mod: 25

## 2023-04-12 RX ORDER — CYANOCOBALAMIN (VITAMIN B-12) 1000 MCG
1000 TABLET ORAL DAILY
Qty: 30 | Refills: 0 | Status: DISCONTINUED | COMMUNITY
Start: 2020-12-15 | End: 2023-04-12

## 2023-04-12 RX ORDER — FENOFIBRIC ACID 135 MG/1
135 CAPSULE, DELAYED RELEASE ORAL DAILY
Qty: 30 | Refills: 6 | Status: DISCONTINUED | COMMUNITY
Start: 2018-02-28 | End: 2023-04-12

## 2023-04-12 RX ORDER — TAMSULOSIN HYDROCHLORIDE 0.4 MG/1
0.4 CAPSULE ORAL
Qty: 30 | Refills: 3 | Status: DISCONTINUED | COMMUNITY
Start: 2021-06-08 | End: 2023-04-12

## 2023-04-12 RX ORDER — ASPIRIN 81 MG/1
81 TABLET, COATED ORAL DAILY
Qty: 30 | Refills: 0 | Status: DISCONTINUED | COMMUNITY
Start: 2020-12-15 | End: 2023-04-12

## 2023-04-12 RX ORDER — FAMOTIDINE 40 MG/1
40 TABLET, FILM COATED ORAL
Qty: 90 | Refills: 1 | Status: DISCONTINUED | COMMUNITY
Start: 2020-01-14 | End: 2023-04-12

## 2023-04-12 RX ORDER — METFORMIN HYDROCHLORIDE 500 MG/1
500 TABLET, COATED ORAL DAILY
Qty: 30 | Refills: 0 | Status: DISCONTINUED | COMMUNITY
Start: 2020-12-15 | End: 2023-04-12

## 2023-04-12 RX ORDER — FOLIC ACID 1 MG/1
1 TABLET ORAL DAILY
Qty: 30 | Refills: 0 | Status: DISCONTINUED | COMMUNITY
Start: 2020-12-15 | End: 2023-04-12

## 2023-04-12 RX ORDER — ICOSAPENT ETHYL 1000 MG/1
1 CAPSULE ORAL
Qty: 360 | Refills: 0 | Status: DISCONTINUED | COMMUNITY
Start: 2022-06-22 | End: 2023-04-12

## 2023-04-12 RX ORDER — ASPIRIN 81 MG
81 TABLET, DELAYED RELEASE (ENTERIC COATED) ORAL
Refills: 0 | Status: DISCONTINUED | COMMUNITY
End: 2023-04-12

## 2023-04-12 RX ORDER — FENOFIBRIC ACID 135 MG/1
135 CAPSULE, DELAYED RELEASE ORAL
Refills: 0 | Status: DISCONTINUED | COMMUNITY
End: 2023-04-12

## 2023-04-12 RX ORDER — OFLOXACIN OTIC 3 MG/ML
0.3 SOLUTION AURICULAR (OTIC)
Qty: 5 | Refills: 0 | Status: DISCONTINUED | COMMUNITY
Start: 2022-04-07 | End: 2023-04-12

## 2023-04-12 RX ORDER — ALBUTEROL SULFATE 90 UG/1
108 (90 BASE) AEROSOL, METERED RESPIRATORY (INHALATION)
Qty: 1 | Refills: 5 | Status: DISCONTINUED | COMMUNITY
Start: 2021-01-06 | End: 2023-04-12

## 2023-04-12 RX ORDER — CILOSTAZOL 50 MG/1
50 TABLET ORAL
Qty: 180 | Refills: 3 | Status: DISCONTINUED | COMMUNITY
Start: 2019-09-03 | End: 2023-04-12

## 2023-04-12 NOTE — HISTORY OF PRESENT ILLNESS
[Former] : former [>= 20 pack years] : >= 20 pack years [TextBox_4] : Overdue for f/u CT chest(letters sent to pt)\par \par Using Symbicort and Spiriva. albuterol  PRN has been using more often\par no recent URI\par never had COVID\par intermittent wheeze.\par ODOM 1 flight.\par Some phlegm feels from [place] Drip. Went to ENT had nasal spray with positive response but stopped.\par Chronic upper airway congestion. \par Not active.\par \par  [TextBox_11] : 1 [TextBox_13] : 30 [YearQuit] : 1995

## 2023-04-12 NOTE — PROCEDURE
[FreeTextEntry1] : 04/12/2023\par Pulmonary function testing\par These data demonstrate a moderate obstructive ventilatory deficit. \par Mild decrease in function compared to June 2022.\par

## 2023-04-12 NOTE — PHYSICAL EXAM
[No Acute Distress] : no acute distress [Supple] : supple [No JVD] : no jvd [Normal S1, S2] : normal s1, s2 [No Murmurs] : no murmurs [Clear to Auscultation Bilaterally] : clear to auscultation bilaterally [Normal to Percussion] : normal to percussion [Benign] : benign [Not Tender] : not tender [No HSM] : no hsm [No Clubbing] : no clubbing [No Cyanosis] : no cyanosis [No Edema] : no edema [Oriented x3] : oriented x3 [TextBox_54] : S/p median sternotomy. [TextBox_68] : No wheezing

## 2023-04-12 NOTE — ASSESSMENT
[FreeTextEntry1] : \par Continue present bronchodilator therapy for present time.  Spiriva and Symbicort.\par Follow-up CT \par Follow-up in 1 month to review CT and obtain formal lung function testing.\par Decision on further management pending above.\par Would benefit from program of exercise and weight loss.\par \par \par 35 minutes spent in evaluation management and review of studies.\par

## 2023-04-20 ENCOUNTER — RESULT REVIEW (OUTPATIENT)
Age: 83
End: 2023-04-20

## 2023-04-20 ENCOUNTER — OUTPATIENT (OUTPATIENT)
Dept: OUTPATIENT SERVICES | Facility: HOSPITAL | Age: 83
LOS: 1 days | End: 2023-04-20
Payer: MEDICARE

## 2023-04-20 ENCOUNTER — APPOINTMENT (OUTPATIENT)
Dept: CT IMAGING | Facility: IMAGING CENTER | Age: 83
End: 2023-04-20
Payer: MEDICARE

## 2023-04-20 DIAGNOSIS — Z87.898 PERSONAL HISTORY OF OTHER SPECIFIED CONDITIONS: ICD-10-CM

## 2023-04-20 PROCEDURE — 71250 CT THORAX DX C-: CPT

## 2023-04-20 PROCEDURE — 71250 CT THORAX DX C-: CPT | Mod: 26

## 2023-06-01 ENCOUNTER — NON-APPOINTMENT (OUTPATIENT)
Age: 83
End: 2023-06-01

## 2023-06-01 RX ORDER — BUDESONIDE AND FORMOTEROL FUMARATE DIHYDRATE 160; 4.5 UG/1; UG/1
160-4.5 AEROSOL RESPIRATORY (INHALATION) TWICE DAILY
Qty: 3 | Refills: 3 | Status: ACTIVE | COMMUNITY
Start: 2021-03-24 | End: 1900-01-01

## 2023-06-21 ENCOUNTER — APPOINTMENT (OUTPATIENT)
Dept: ENDOCRINOLOGY | Facility: CLINIC | Age: 83
End: 2023-06-21
Payer: MEDICARE

## 2023-06-21 VITALS
DIASTOLIC BLOOD PRESSURE: 82 MMHG | BODY MASS INDEX: 33.26 KG/M2 | HEART RATE: 88 BPM | HEIGHT: 70 IN | OXYGEN SATURATION: 96 % | TEMPERATURE: 98.5 F | SYSTOLIC BLOOD PRESSURE: 144 MMHG | WEIGHT: 232.31 LBS

## 2023-06-21 LAB
GLUCOSE BLDC GLUCOMTR-MCNC: 165
HBA1C MFR BLD HPLC: 7.5

## 2023-06-21 PROCEDURE — 82962 GLUCOSE BLOOD TEST: CPT

## 2023-06-21 PROCEDURE — 99204 OFFICE O/P NEW MOD 45 MIN: CPT | Mod: 25

## 2023-06-21 PROCEDURE — 83036 HEMOGLOBIN GLYCOSYLATED A1C: CPT | Mod: QW

## 2023-06-21 PROCEDURE — 36415 COLL VENOUS BLD VENIPUNCTURE: CPT

## 2023-06-23 LAB
ALBUMIN SERPL ELPH-MCNC: 4.6 G/DL
ALP BLD-CCNC: 44 U/L
ALT SERPL-CCNC: 61 U/L
ANION GAP SERPL CALC-SCNC: 19 MMOL/L
AST SERPL-CCNC: 37 U/L
BILIRUB SERPL-MCNC: 0.5 MG/DL
BUN SERPL-MCNC: 27 MG/DL
CALCIUM SERPL-MCNC: 10.4 MG/DL
CHLORIDE SERPL-SCNC: 98 MMOL/L
CHOLEST SERPL-MCNC: 142 MG/DL
CO2 SERPL-SCNC: 20 MMOL/L
CREAT SERPL-MCNC: 1.19 MG/DL
EGFR: 61 ML/MIN/1.73M2
ESTIMATED AVERAGE GLUCOSE: 183 MG/DL
FRUCTOSAMINE SERPL-MCNC: 304 UMOL/L
GLUCOSE SERPL-MCNC: 151 MG/DL
GLYCOMARK.: 1.3 UG/ML
HBA1C MFR BLD HPLC: 8 %
HDLC SERPL-MCNC: 23 MG/DL
LDLC SERPL CALC-MCNC: NORMAL MG/DL
LDLC SERPL DIRECT ASSAY-MCNC: 57 MG/DL
MAGNESIUM SERPL-MCNC: 2 MG/DL
NONHDLC SERPL-MCNC: 119 MG/DL
POTASSIUM SERPL-SCNC: 4.5 MMOL/L
PROT SERPL-MCNC: 7.4 G/DL
SODIUM SERPL-SCNC: 137 MMOL/L
T3FREE SERPL-MCNC: 2.33 PG/ML
T4 FREE SERPL-MCNC: 1.1 NG/DL
TRIGL SERPL-MCNC: 505 MG/DL
TSH SERPL-ACNC: 3.1 UIU/ML

## 2023-06-25 NOTE — HISTORY OF PRESENT ILLNESS
[FreeTextEntry1] : Mr. ALEXIA MASON is a 83 year old male who presents for initial endocrine evaluation with regard to a hx of type 2 dm. The diabetes has been present for about 6   years. He denies any history of retinopathy or nephropathy. With regard to neuropathy,he  reports stabbing sensation in feet b/l, he did take vitamins as per podiatry but it did not help  More numbness\par \par For the diabetes, he   is currently taking  metformin 500 mg BID \par he report diarrhea for last 6 months-1 year prior thought it was related to fruit intake . he has been on metformin for last 6 years . Pending appt with GI on 6/28/23 \par \par  He   denies polyuria, polydipsia, or any visual changes. He  too denies any skin lesions, skin breakdown or non-healing areas of skin. He  too denies any podiatric concerns. Ophthalmologic evaluation is up to date. \par \par Home glucose monitoring has shown values to be running -190s, as of late 200-225 .  He  does deny any hypoglycemia or hypoglycemic signs or symptoms.\par \par \par POCT A1C returned today 7.5%   \par POCT glucose returned today at  165  mg/dl\par \par Additional medical history includes that of  AAA, CAD s/p CABG x 3 about 7-8 years ago , COPD, HLD, HTN, obesity, pulmonary nodules,\par Medications: atorvastatin 80, fenofibrate 145 mg, losartan 100 mg, metoprolol 50 mg , cilastazol 50 mg asa 81 , plavix , vascepa 2 capsules, tamsulosin, \par \par Past smoker for 28 years  , quit 28 years ago . Denies drinking or drug use. \par \par Family Hx: father had Dm2 and CAD \par \par PCP/ Cardio Dr. Marcum \par \par Thge diarrhea is intermittent and was not prewstn for all the years.\par \par Hs oinme touch verio\par \par Optho neg  asSees pod

## 2023-06-25 NOTE — ADDENDUM
[FreeTextEntry1] : poct glucose and a1c were carried out today given diabetes diagnosis \par blood will be drawn in the office today\par

## 2023-07-11 ENCOUNTER — APPOINTMENT (OUTPATIENT)
Dept: VASCULAR SURGERY | Facility: CLINIC | Age: 83
End: 2023-07-11
Payer: MEDICARE

## 2023-07-11 VITALS — DIASTOLIC BLOOD PRESSURE: 75 MMHG | HEART RATE: 71 BPM | TEMPERATURE: 98.7 F | SYSTOLIC BLOOD PRESSURE: 124 MMHG

## 2023-07-11 PROCEDURE — 99214 OFFICE O/P EST MOD 30 MIN: CPT

## 2023-07-11 PROCEDURE — 93926 LOWER EXTREMITY STUDY: CPT | Mod: RT

## 2023-07-11 RX ORDER — CILOSTAZOL 50 MG/1
50 TABLET ORAL
Qty: 180 | Refills: 3 | Status: ACTIVE | COMMUNITY
Start: 2023-07-11 | End: 1900-01-01

## 2023-07-11 RX ORDER — METOPROLOL TARTRATE 50 MG/1
50 TABLET, FILM COATED ORAL
Qty: 60 | Refills: 6 | Status: ACTIVE | COMMUNITY
Start: 2023-07-11 | End: 1900-01-01

## 2023-07-11 RX ORDER — CLOPIDOGREL BISULFATE 75 MG/1
75 TABLET, FILM COATED ORAL
Qty: 90 | Refills: 3 | Status: ACTIVE | COMMUNITY
Start: 2023-07-11 | End: 1900-01-01

## 2023-07-11 NOTE — PHYSICAL EXAM
[1+] : right 1+ [2+] : left 2+ [Ankle Swelling (On Exam)] : present [Ankle Swelling Bilaterally] : bilaterally  [Varicose Veins Of Lower Extremities] : bilaterally [] : bilaterally [Ankle Swelling On The Right] : mild [No Rash or Lesion] : No rash or lesion [Alert] : alert [Oriented to Person] : oriented to person [Oriented to Place] : oriented to place [Oriented to Time] : oriented to time [Calm] : calm [JVD] : no jugular venous distention  [Tender] : was nontender [de-identified] : nad [de-identified] : wnl [de-identified] : no resp effort  [FreeTextEntry1] : mild art insuff w mild trophic skin changes\par mod venous insuff w mod st dermatitis and mild edema \par no wounds  [de-identified] : wnl ambulates slowly [de-identified] : cn 2-12 tiffany grossly intact [de-identified] : cooperative

## 2023-07-11 NOTE — HISTORY OF PRESENT ILLNESS
[FreeTextEntry1] : pt  has ongoing tiffany  rt foot slightly worse discomfort intensity very  mild and unchanged since last ov \par pt is on plavix 75 qd and pletal 50 bid \par pt states that he received carotid duplex  by his cardiologist sev mo ago\par pt did not bring this for review \par \par  [de-identified] : pt reports tiffany le and foot  mild intermittent discomfort \par overall  unchanged  since last ov\par pt is on plavix 75 mf daily\par pt is on pletal 50 bid \par

## 2023-07-11 NOTE — ASSESSMENT
[Arterial/Venous Disease] : arterial/venous disease [Medication Management] : medication management [Foot care/Footwear] : foot care/footwear [FreeTextEntry1] : Impression stable lt rosa and art insuff stable, patent rle bypass and carotid stenosis\par \par \par \par [facility] management exercise program, protective measures\par d/w pt  trial of  pletal to increase pletal 50 bid to 100 bid \par pt wants to hold off  and call if sx worsen \par Continue Plavix 75 qd\par continue pletal 50 bid \par ov w AID s/o AAA (pt has smoking hx) 5years june 2026\par  ov and RLE arterial duplex s/o bypass stenosis  12 mo july 2024\par Ov w LLE art duplex s/o ROSA and zenia/pvr s/o art insuff   12mo dec  2023\par carotid duplex s/o stenosis  next avail   then telehealth\par \par \par \par

## 2023-08-07 ENCOUNTER — APPOINTMENT (OUTPATIENT)
Dept: VASCULAR SURGERY | Facility: CLINIC | Age: 83
End: 2023-08-07
Payer: MEDICARE

## 2023-08-07 PROCEDURE — 93880 EXTRACRANIAL BILAT STUDY: CPT

## 2023-08-16 ENCOUNTER — APPOINTMENT (OUTPATIENT)
Dept: VASCULAR SURGERY | Facility: CLINIC | Age: 83
End: 2023-08-16
Payer: MEDICARE

## 2023-08-16 DIAGNOSIS — I65.23 OCCLUSION AND STENOSIS OF BILATERAL CAROTID ARTERIES: ICD-10-CM

## 2023-08-16 DIAGNOSIS — I77.1 STRICTURE OF ARTERY: ICD-10-CM

## 2023-08-16 PROCEDURE — 99442: CPT

## 2023-08-16 NOTE — PHYSICAL EXAM
[No Rash or Lesion] : No rash or lesion [Alert] : alert [Oriented to Person] : oriented to person [Oriented to Place] : oriented to place [Oriented to Time] : oriented to time [Calm] : calm [de-identified] : no resp effort  [FreeTextEntry1] : Physical exam findings via telephonic review with patient   [de-identified] : cooperative

## 2023-08-16 NOTE — REASON FOR VISIT
[Follow-Up: _____] : a [unfilled] follow-up visit [Home] : at home, [unfilled] , at the time of the visit. [Medical Office: ([facility])___] : at the medical office located in  [Other:____] : [unfilled] [Verbal consent obtained from patient] : the patient, [unfilled] [FreeTextEntry1] : i have poor leg circulation and my legs bother me

## 2023-08-16 NOTE — DATA REVIEWED
[FreeTextEntry1] : 01/09/2013 Arterial Duplex  rle bypass patent  01/09/2013 AID aa not seen due to gas  02/26/2013 ZENIA/PVR no sig art occ dz (sig improvement in rle perfusion pressures) rt zenia 1.06 lt zenia 1.08  2/25/2014 ZENIA/PVR no sig art occ dz rt zenia 1.15 lt zenia 1.23  2/25/2014 Arterial Duplex RLE bypass patent LLE no renetta  2/24/2015 ZENIA/PVR mod infragenic art occ dz tiffany le w vessel calcification  rt zenia 1.21 lt zenia 1.2  2/24/2015 RLE Art Duplex patent sfa to bk pop bypass  4/7/2015  LLE Arterial Duplex  sig Lt PA ecatsia 1.7cm no evid of mural thrombus  4/28/2015 AID AA 1.8cm   10/06/2015  lle duplex sig for pop art ectasia 1.6cm  3/4/2016 ZENIA/PVR rle mild to mod infragenic art insuff lle no sig art occ dz sig for vessel calcification rt zenia 1.09 lt zenia 1.13  3/4/2016 LLE Arterial duplex RENETTA sig 1.8cm diam   10/21/2016 RLE Art Duplex patent  fem pop byp   4/19/2017 ZENIA/PVR RLE mild inframall art insuff  lle no sig art insuff rt zenia 1.07 lt zenia 1.22  4/19/2017 LLE Arterial Doppler  Lt Pop A robby 1.8cm   10/24/2017  RLE Arterial Duplex  patent fem pop byp  no flow restrictive lesions   2/28/2018 AID for  s/o AAA not performed awaiting  ins co approval  2/28/2018 Carotid duplex  tiffany ica less 50% stenosis  tiffany ant  va flow  5/25/2018 LLE Arterial Duplex Lt RENETTA 1.9cm   5/25/2018 ZENIA/PVR  RLE mild  infragenic art insuff lle no sig art occ dz  Rt ZENIA 1.09 lt zenia 1.20  6/27/2018 AID patent AA and tiffany iliac arterial systems w/o robby evidence   5/28/2019 ZENIA/PVR  RLE mild  infragenic art insuff lle no sig art occ dz  Rt ZENIA 1.04 lt zenia 1.13  5/28/2019 LLE Arterial Duplex Lt RENETTA 1.9cm   9/3/2019 RLE Arterial Duplex  patent SFA to BK  pop byp  no flow restrictive lesions   12/15/2020 ZENIA/PVR  RLE mild  infragenic art insuff lle no sig art occ dz                                               Rt ZENIA 1.11 lt zenia 1.12  12/15/2020 RLE Arterial Duplex  patent SFA to BK  pop byp  no flow restrictive lesions   2/8/2021 Carotid Duplex  Rt ICA  less 50%  by velocity criteria                          Lt  ICA  less 50%  by velocity criteria                          Tiffany Ant Vertebral Arterial Flow   6/8/2021 RLE Arterial Duplex  patent SFA to BK  pop byp  no flow restrictive lesions   6/8/2021  AID patent AA and tiffany iliac arterial systems w/o robby evidence   12/7/2021  RLE Arterial Duplex  patent SFA to BK  pop byp  no flow restrictive lesions   12/7/2021 ZENIA/PVR  RLE mild  infragenic art insuff lle no sig art occ dz                                               Rt ZENIA 1.27 lt zenia 1.16  6/28/2022 RLE Arterial Duplex  patent SFA to BK  pop byp  no flow restrictive lesions    6/28/2022  ZENIA/PVR  RLE mild  infragenic art insuff lle no sig art occ dz                                               Rt ZENIA 1.19 lt zenia 1.19   12/6/2022 ZENIA/PVR  RLE moderate   infragenic art insuff                                  LLE mild  art occ dz   w vessel calcification                                              Rt ZENIA 1.26 lt zenia 1.20  12/6/2022  RLE Arterial Duplex  patent SFA to BK  pop byp  no flow restrictive lesions                                        GORGE occlusion w DPA reconstitution   7/11/2023 RLE Arterial Duplex  patent SFA to BK  pop byp  no flow restrictive lesions   8/7/2023 Carotid Duplex  Rt ICA  less 50%  by velocity criteria                          Lt  ICA  less 50%  by velocity criteria                          Tiffany Ant Vertebral Arterial Flow

## 2023-08-16 NOTE — HISTORY OF PRESENT ILLNESS
[FreeTextEntry1] : pt  has ongoing tiffany  rt foot slightly worse discomfort intensity very  mild and unchanged since last ov \par  pt is on plavix 75 qd and pletal 50 bid \par  pt states that he received carotid duplex  by his cardiologist sev mo ago\par  pt did not bring this for review \par  \par   [de-identified] : pt reports tiffany le and foot  mild intermittent discomfort \par  overall  unchanged  since last ov\par  pt is on plavix 75 mf daily\par  pt is on pletal 50 bid \par

## 2023-08-16 NOTE — ASSESSMENT
[Arterial/Venous Disease] : arterial/venous disease [Medication Management] : medication management [Foot care/Footwear] : foot care/footwear [FreeTextEntry1] : Impression stable lt rosa and art insuff stable, patent rle bypass and carotid stenosis    [employer] management exercise program, protective measures d/w pt  trial of  pletal to increase pletal 50 bid to 100 bid  pt wants to hold off  and call if sx worsen  Continue Plavix 75 qd continue pletal 50 bid  ov w AID s/o AAA (pt has smoking hx) 5years june 2026 ov and carotid duplex s/o stenosis   2 years  aug 2025 ov and RLE arterial duplex s/o bypass stenosis  12 mo july 2024 Ov w LLE art duplex s/o ROSA and zenia/pvr s/o art insuff   12mo dec  2023 Telephonic visit  time duration 11 min

## 2023-09-06 ENCOUNTER — APPOINTMENT (OUTPATIENT)
Dept: PULMONOLOGY | Facility: CLINIC | Age: 83
End: 2023-09-06
Payer: MEDICARE

## 2023-09-06 VITALS
HEIGHT: 70 IN | TEMPERATURE: 97.5 F | BODY MASS INDEX: 45.1 KG/M2 | HEART RATE: 78 BPM | OXYGEN SATURATION: 97 % | DIASTOLIC BLOOD PRESSURE: 80 MMHG | WEIGHT: 315 LBS | SYSTOLIC BLOOD PRESSURE: 162 MMHG

## 2023-09-06 DIAGNOSIS — J44.9 CHRONIC OBSTRUCTIVE PULMONARY DISEASE, UNSPECIFIED: ICD-10-CM

## 2023-09-06 DIAGNOSIS — R06.02 SHORTNESS OF BREATH: ICD-10-CM

## 2023-09-06 DIAGNOSIS — R93.89 ABNORMAL FINDINGS ON DIAGNOSTIC IMAGING OF OTHER SPECIFIED BODY STRUCTURES: ICD-10-CM

## 2023-09-06 PROCEDURE — ZZZZZ: CPT

## 2023-09-06 PROCEDURE — 94060 EVALUATION OF WHEEZING: CPT

## 2023-09-06 PROCEDURE — 99214 OFFICE O/P EST MOD 30 MIN: CPT | Mod: 25

## 2023-09-06 PROCEDURE — 94729 DIFFUSING CAPACITY: CPT

## 2023-09-06 PROCEDURE — 85018 HEMOGLOBIN: CPT | Mod: QW

## 2023-09-06 PROCEDURE — 94727 GAS DIL/WSHOT DETER LNG VOL: CPT

## 2023-09-06 RX ORDER — FLUTICASONE FUROATE, UMECLIDINIUM BROMIDE AND VILANTEROL TRIFENATATE 100; 62.5; 25 UG/1; UG/1; UG/1
100-62.5-25 POWDER RESPIRATORY (INHALATION)
Qty: 3 | Refills: 1 | Status: DISCONTINUED | COMMUNITY
Start: 2019-09-10 | End: 2023-09-06

## 2023-09-07 LAB — HEMOGLOBIN: 15.5

## 2023-09-07 NOTE — PROCEDURE
[FreeTextEntry1] : 09/06/2023 Pulmonary function testing These data demonstrate a moderate obstructive ventilatory deficit. There is no significant bronchodilator responce. There is evidence of mild hyperinflation. There is elevation in the RV/TLC ratio indicative of possible air trapping. Normal Lung Volumes. Diffusion capacity is normal.  PFT attached relatively stable function.

## 2023-09-07 NOTE — HISTORY OF PRESENT ILLNESS
[Former] : former [>= 20 pack years] : >= 20 pack years [TextBox_4] : Had ct Apri 2023. ODOM 1-1 1/2 flight Some increase in rescue inhaler. Using pre exercise.  Using Symbicort and Spiriva. albuterol  PRN has been using more often. no recent URI never had COVID intermittent wheeze. Some phlegm feels from PN Drip. Went to ENT had nasal spray with positive response but stopped. Chronic upper airway congestion.     [TextBox_11] : 1 [TextBox_13] : 30 [YearQuit] : 1995

## 2023-09-07 NOTE — DISCUSSION/SUMMARY
[FreeTextEntry1] : Chronic obstructive pulmonary disease.  Function stable with mild increase in beta agonist use.  Predominantly preexercise. Pulmonary nodules.  1 groundglass nodule.  Radiographically stable.  Last CT April 2023 Overweight. Diabetes mellitus.

## 2023-09-07 NOTE — ASSESSMENT
[FreeTextEntry1] : Continue present bronchodilator therapy for present time.  Spiriva and Symbicort. Follow-up CT 18 months   10/24 task sent as reminder.  Would benefit from program of exercise and weight loss.   35 minutes spent in evaluation management and review of studies.

## 2023-10-24 ENCOUNTER — APPOINTMENT (OUTPATIENT)
Dept: ENDOCRINOLOGY | Facility: CLINIC | Age: 83
End: 2023-10-24
Payer: MEDICARE

## 2023-10-24 VITALS
DIASTOLIC BLOOD PRESSURE: 70 MMHG | HEART RATE: 80 BPM | BODY MASS INDEX: 33.28 KG/M2 | SYSTOLIC BLOOD PRESSURE: 100 MMHG | HEIGHT: 70 IN | TEMPERATURE: 97 F | OXYGEN SATURATION: 96 % | WEIGHT: 232.5 LBS

## 2023-10-24 DIAGNOSIS — I25.10 ATHEROSCLEROTIC HEART DISEASE OF NATIVE CORONARY ARTERY W/OUT ANGINA PECTORIS: ICD-10-CM

## 2023-10-24 LAB
GLUCOSE BLDC GLUCOMTR-MCNC: 173
HBA1C MFR BLD HPLC: 7

## 2023-10-24 PROCEDURE — 82962 GLUCOSE BLOOD TEST: CPT

## 2023-10-24 PROCEDURE — 99214 OFFICE O/P EST MOD 30 MIN: CPT | Mod: 25

## 2023-10-24 PROCEDURE — 83036 HEMOGLOBIN GLYCOSYLATED A1C: CPT | Mod: QW

## 2023-10-24 PROCEDURE — 36415 COLL VENOUS BLD VENIPUNCTURE: CPT

## 2023-10-25 LAB
ALBUMIN SERPL ELPH-MCNC: 4.6 G/DL
ALP BLD-CCNC: 39 U/L
ALT SERPL-CCNC: 53 U/L
ANION GAP SERPL CALC-SCNC: 14 MMOL/L
AST SERPL-CCNC: 35 U/L
BILIRUB SERPL-MCNC: 0.4 MG/DL
BUN SERPL-MCNC: 27 MG/DL
CALCIUM SERPL-MCNC: 10.4 MG/DL
CHLORIDE SERPL-SCNC: 101 MMOL/L
CHOLEST SERPL-MCNC: 147 MG/DL
CO2 SERPL-SCNC: 21 MMOL/L
CREAT SERPL-MCNC: 1.14 MG/DL
CREAT SPEC-SCNC: 107 MG/DL
EGFR: 65 ML/MIN/1.73M2
ESTIMATED AVERAGE GLUCOSE: 154 MG/DL
FRUCTOSAMINE SERPL-MCNC: 268 UMOL/L
GLUCOSE SERPL-MCNC: 172 MG/DL
GLYCOMARK.: 1.6 UG/ML
HBA1C MFR BLD HPLC: 7 %
HDLC SERPL-MCNC: 23 MG/DL
LDLC SERPL CALC-MCNC: 52 MG/DL
LDLC SERPL DIRECT ASSAY-MCNC: 64 MG/DL
MAGNESIUM SERPL-MCNC: 2 MG/DL
MICROALBUMIN 24H UR DL<=1MG/L-MCNC: 4 MG/DL
MICROALBUMIN/CREAT 24H UR-RTO: 37 MG/G
NONHDLC SERPL-MCNC: 124 MG/DL
POTASSIUM SERPL-SCNC: 4.7 MMOL/L
PROT SERPL-MCNC: 7.5 G/DL
SODIUM SERPL-SCNC: 137 MMOL/L
T3FREE SERPL-MCNC: 2.62 PG/ML
T4 FREE SERPL-MCNC: 1.1 NG/DL
TRIGL SERPL-MCNC: 483 MG/DL
TSH SERPL-ACNC: 2.3 UIU/ML

## 2023-12-12 ENCOUNTER — APPOINTMENT (OUTPATIENT)
Dept: VASCULAR SURGERY | Facility: CLINIC | Age: 83
End: 2023-12-12

## 2024-02-29 NOTE — DATA REVIEWED
Patient called forms dept and lvm stating forms dates were incorrect. Pt states his wifes employer is stating that the la reflects 1/9/24 as the start date of leave. Informed pt that is not the case and that 1/9/24 is the date we put for condition commencement date. Certified leave dates reflect 2/9/24-3/22/24. Advised pt to have employer or spouse call us with questions.   [FreeTextEntry1] : 01/09/2013 Arterial Duplex  rle bypass patent\par \par 01/09/2013 AID aa not seen due to gas\par \par 02/26/2013 ZENIA/PVR no sig art occ dz (sig improvement in rle perfusion pressures) rt zenia 1.06 lt zenia 1.08\par \par 2/25/2014 ZENIA/PVR no sig art occ dz rt zenia 1.15 lt zenia 1.23\par \par 2/25/2014 Arterial Duplex RLE bypass patent LLE no renetta\par \par 2/24/2015 ZENIA/PVR mod infragenic art occ dz tiffany le w vessel calcification  rt zenia 1.21 lt zenia 1.2\par \par 2/24/2015 RLE Art Duplex patent sfa to bk pop bypass\par \par 4/7/2015  LLE Arterial Duplex  sig Lt PA ecatsia 1.7cm no evid of mural thrombus\par \par 4/28/2015 AID AA 1.8cm \par \par 10/06/2015  lle duplex sig for pop art ectasia 1.6cm\par \par 3/4/2016 ZENIA/PVR rle mild to mod infragenic art insuff lle no sig art occ dz sig for vessel calcification rt zenia 1.09 lt zenia 1.13\par \par 3/4/2016 LLE Arterial duplex RENETTA sig 1.8cm diam \par \par 10/21/2016 RLE Art Duplex patent  fem pop byp \par \par 4/19/2017 ZENIA/PVR RLE mild inframall art insuff  lle no sig art insuff rt zenia 1.07 lt zenia 1.22\par \par 4/19/2017 LLE Arterial Doppler  Lt Pop A robby 1.8cm \par \par 10/24/2017  RLE Arterial Duplex  patent fem pop byp  no flow restrictive lesions \par \par 2/28/2018 AID for  s/o AAA not performed awaiting  ins co approval\par \par 2/28/2018 Carotid duplex  tiffany ica less 50% stenosis  tiffany ant  va flow\par \par 5/25/2018 LLE Arterial Duplex Lt RENETTA 1.9cm \par \par 5/25/2018 ZENIA/PVR  RLE mild  infragenic art insuff lle no sig art occ dz  Rt ZENIA 1.09 lt zenia 1.20\par \par 6/27/2018 AID patent AA and tiffany iliac arterial systems w/o robby evidence \par \par 5/28/2019 ZENIA/PVR  RLE mild  infragenic art insuff lle no sig art occ dz  Rt ZENIA 1.04 lt zenia 1.13\par \par 5/28/2019 LLE Arterial Duplex Lt RENETTA 1.9cm \par \par 9/3/2019 RLE Arterial Duplex  patent SFA to BK  pop byp  no flow restrictive lesions \par \par 12/15/2020 ZENIA/PVR  RLE mild  infragenic art insuff lle no sig art occ dz  \par                                             Rt ZENIA 1.11 lt zenia 1.12\par \par 12/15/2020 RLE Arterial Duplex  patent SFA to BK  pop byp  no flow restrictive lesions \par \par 2/8/2021 Carotid Duplex  Rt ICA  less 50%  by velocity criteria\par                          Lt  ICA  less 50%  by velocity criteria\par                          Tiffany Ant Vertebral Arterial Flow \par \par 6/8/2021 RLE Arterial Duplex  patent SFA to BK  pop byp  no flow restrictive lesions \par \par 6/8/2021  AID patent AA and tiffany iliac arterial systems w/o robby evidence \par \par 12/7/2021  RLE Arterial Duplex  patent SFA to BK  pop byp  no flow restrictive lesions \par \par 12/7/2021 ZENIA/PVR  RLE mild  infragenic art insuff lle no sig art occ dz  \par                                             Rt ZENIA 1.27 lt zenia 1.16\par \par 6/28/2022 RLE Arterial Duplex  patent SFA to BK  pop byp  no flow restrictive lesions \par \par \par 6/28/2022  ZENIA/PVR  RLE mild  infragenic art insuff lle no sig art occ dz  \par                                             Rt ZENIA 1.19 lt zenia 1.19\par \par \par 12/6/2022 ZENIA/PVR  RLE moderate   infragenic art insuff \par                                 LLE mild  art occ dz   w vessel calcification \par                                             Rt ZENIA 1.26 lt zenia 1.20\par \par 12/6/2022  RLE Arterial Duplex  patent SFA to BK  pop byp  no flow restrictive lesions \par                                       GORGE occlusion w DPA reconstitution\par \par \par 7/11/2023 RLE Arterial Duplex  patent SFA to BK  pop byp  no flow restrictive lesions \par \par \par

## 2024-03-06 RX ORDER — BUDESONIDE AND FORMOTEROL FUMARATE DIHYDRATE 160; 4.5 UG/1; UG/1
160-4.5 AEROSOL RESPIRATORY (INHALATION) TWICE DAILY
Qty: 3 | Refills: 3 | Status: ACTIVE | COMMUNITY
Start: 1900-01-01 | End: 1900-01-01

## 2024-03-11 ENCOUNTER — APPOINTMENT (OUTPATIENT)
Dept: CT IMAGING | Facility: IMAGING CENTER | Age: 84
End: 2024-03-11
Payer: MEDICARE

## 2024-03-11 ENCOUNTER — APPOINTMENT (OUTPATIENT)
Dept: RADIOLOGY | Facility: IMAGING CENTER | Age: 84
End: 2024-03-11
Payer: MEDICARE

## 2024-03-11 ENCOUNTER — OUTPATIENT (OUTPATIENT)
Dept: OUTPATIENT SERVICES | Facility: HOSPITAL | Age: 84
LOS: 1 days | End: 2024-03-11
Payer: MEDICARE

## 2024-03-11 DIAGNOSIS — Z00.8 ENCOUNTER FOR OTHER GENERAL EXAMINATION: ICD-10-CM

## 2024-03-11 PROCEDURE — 71250 CT THORAX DX C-: CPT | Mod: 26

## 2024-03-11 PROCEDURE — 71250 CT THORAX DX C-: CPT

## 2024-03-21 ENCOUNTER — TRANSCRIPTION ENCOUNTER (OUTPATIENT)
Age: 84
End: 2024-03-21

## 2024-03-21 ENCOUNTER — INPATIENT (INPATIENT)
Facility: HOSPITAL | Age: 84
LOS: 0 days | Discharge: HOME | DRG: 322 | End: 2024-03-22
Attending: INTERNAL MEDICINE | Admitting: INTERNAL MEDICINE
Payer: MEDICARE

## 2024-03-21 VITALS
OXYGEN SATURATION: 98 % | HEIGHT: 70 IN | RESPIRATION RATE: 18 BRPM | DIASTOLIC BLOOD PRESSURE: 91 MMHG | SYSTOLIC BLOOD PRESSURE: 178 MMHG | HEART RATE: 97 BPM | TEMPERATURE: 98 F | WEIGHT: 238.98 LBS

## 2024-03-21 DIAGNOSIS — R94.39 ABNORMAL RESULT OF OTHER CARDIOVASCULAR FUNCTION STUDY: ICD-10-CM

## 2024-03-21 DIAGNOSIS — Z95.1 PRESENCE OF AORTOCORONARY BYPASS GRAFT: Chronic | ICD-10-CM

## 2024-03-21 LAB
ANION GAP SERPL CALC-SCNC: 15 MMOL/L — SIGNIFICANT CHANGE UP (ref 5–17)
BUN SERPL-MCNC: 27 MG/DL — HIGH (ref 7–23)
CALCIUM SERPL-MCNC: 9.9 MG/DL — SIGNIFICANT CHANGE UP (ref 8.4–10.5)
CHLORIDE SERPL-SCNC: 98 MMOL/L — SIGNIFICANT CHANGE UP (ref 96–108)
CO2 SERPL-SCNC: 19 MMOL/L — LOW (ref 22–31)
CREAT SERPL-MCNC: 1.19 MG/DL — SIGNIFICANT CHANGE UP (ref 0.5–1.3)
EGFR: 61 ML/MIN/1.73M2 — SIGNIFICANT CHANGE UP
GLUCOSE BLDC GLUCOMTR-MCNC: 168 MG/DL — HIGH (ref 70–99)
GLUCOSE BLDC GLUCOMTR-MCNC: 172 MG/DL — HIGH (ref 70–99)
GLUCOSE BLDC GLUCOMTR-MCNC: 182 MG/DL — HIGH (ref 70–99)
GLUCOSE BLDC GLUCOMTR-MCNC: 196 MG/DL — HIGH (ref 70–99)
GLUCOSE BLDC GLUCOMTR-MCNC: 216 MG/DL — HIGH (ref 70–99)
GLUCOSE SERPL-MCNC: 183 MG/DL — HIGH (ref 70–99)
HCT VFR BLD CALC: 47 % — SIGNIFICANT CHANGE UP (ref 39–50)
HGB BLD-MCNC: 15.8 G/DL — SIGNIFICANT CHANGE UP (ref 13–17)
MCHC RBC-ENTMCNC: 28.3 PG — SIGNIFICANT CHANGE UP (ref 27–34)
MCHC RBC-ENTMCNC: 33.6 GM/DL — SIGNIFICANT CHANGE UP (ref 32–36)
MCV RBC AUTO: 84.1 FL — SIGNIFICANT CHANGE UP (ref 80–100)
NRBC # BLD: 0 /100 WBCS — SIGNIFICANT CHANGE UP (ref 0–0)
PLATELET # BLD AUTO: 174 K/UL — SIGNIFICANT CHANGE UP (ref 150–400)
POTASSIUM SERPL-MCNC: 4.6 MMOL/L — SIGNIFICANT CHANGE UP (ref 3.5–5.3)
POTASSIUM SERPL-MCNC: SIGNIFICANT CHANGE UP MMOL/L (ref 3.5–5.3)
POTASSIUM SERPL-SCNC: 4.6 MMOL/L — SIGNIFICANT CHANGE UP (ref 3.5–5.3)
POTASSIUM SERPL-SCNC: SIGNIFICANT CHANGE UP MMOL/L (ref 3.5–5.3)
RBC # BLD: 5.59 M/UL — SIGNIFICANT CHANGE UP (ref 4.2–5.8)
RBC # FLD: 15 % — HIGH (ref 10.3–14.5)
SODIUM SERPL-SCNC: 132 MMOL/L — LOW (ref 135–145)
WBC # BLD: 7.49 K/UL — SIGNIFICANT CHANGE UP (ref 3.8–10.5)
WBC # FLD AUTO: 7.49 K/UL — SIGNIFICANT CHANGE UP (ref 3.8–10.5)

## 2024-03-21 PROCEDURE — 93010 ELECTROCARDIOGRAM REPORT: CPT | Mod: 77

## 2024-03-21 PROCEDURE — 92928 PRQ TCAT PLMT NTRAC ST 1 LES: CPT | Mod: LC

## 2024-03-21 PROCEDURE — 93010 ELECTROCARDIOGRAM REPORT: CPT

## 2024-03-21 PROCEDURE — 93455 CORONARY ART/GRFT ANGIO S&I: CPT | Mod: 26,59

## 2024-03-21 PROCEDURE — 99152 MOD SED SAME PHYS/QHP 5/>YRS: CPT

## 2024-03-21 RX ORDER — TAMSULOSIN HYDROCHLORIDE 0.4 MG/1
0.4 CAPSULE ORAL AT BEDTIME
Refills: 0 | Status: DISCONTINUED | OUTPATIENT
Start: 2024-03-21 | End: 2024-03-22

## 2024-03-21 RX ORDER — ATORVASTATIN CALCIUM 80 MG/1
1 TABLET, FILM COATED ORAL
Qty: 30 | Refills: 3
Start: 2024-03-21 | End: 2024-07-18

## 2024-03-21 RX ORDER — SODIUM CHLORIDE 9 MG/ML
1000 INJECTION, SOLUTION INTRAVENOUS
Refills: 0 | Status: DISCONTINUED | OUTPATIENT
Start: 2024-03-21 | End: 2024-03-22

## 2024-03-21 RX ORDER — DEXTROSE 50 % IN WATER 50 %
25 SYRINGE (ML) INTRAVENOUS ONCE
Refills: 0 | Status: DISCONTINUED | OUTPATIENT
Start: 2024-03-21 | End: 2024-03-22

## 2024-03-21 RX ORDER — SODIUM CHLORIDE 9 MG/ML
1000 INJECTION INTRAMUSCULAR; INTRAVENOUS; SUBCUTANEOUS
Refills: 0 | Status: DISCONTINUED | OUTPATIENT
Start: 2024-03-21 | End: 2024-03-22

## 2024-03-21 RX ORDER — TIOTROPIUM BROMIDE 18 UG/1
2 CAPSULE ORAL; RESPIRATORY (INHALATION) DAILY
Refills: 0 | Status: DISCONTINUED | OUTPATIENT
Start: 2024-03-21 | End: 2024-03-22

## 2024-03-21 RX ORDER — LOSARTAN POTASSIUM 100 MG/1
100 TABLET, FILM COATED ORAL DAILY
Refills: 0 | Status: DISCONTINUED | OUTPATIENT
Start: 2024-03-21 | End: 2024-03-22

## 2024-03-21 RX ORDER — ASPIRIN/CALCIUM CARB/MAGNESIUM 324 MG
1 TABLET ORAL
Qty: 0 | Refills: 0 | DISCHARGE
Start: 2024-03-21

## 2024-03-21 RX ORDER — SITAGLIPTIN 50 MG/1
1 TABLET, FILM COATED ORAL
Refills: 0 | DISCHARGE

## 2024-03-21 RX ORDER — ATORVASTATIN CALCIUM 80 MG/1
80 TABLET, FILM COATED ORAL AT BEDTIME
Refills: 0 | Status: DISCONTINUED | OUTPATIENT
Start: 2024-03-21 | End: 2024-03-22

## 2024-03-21 RX ORDER — TIOTROPIUM BROMIDE 18 UG/1
2 CAPSULE ORAL; RESPIRATORY (INHALATION)
Refills: 0 | DISCHARGE

## 2024-03-21 RX ORDER — CLOPIDOGREL BISULFATE 75 MG/1
75 TABLET, FILM COATED ORAL DAILY
Refills: 0 | Status: DISCONTINUED | OUTPATIENT
Start: 2024-03-21 | End: 2024-03-22

## 2024-03-21 RX ORDER — LOSARTAN POTASSIUM 100 MG/1
1 TABLET, FILM COATED ORAL
Refills: 0 | DISCHARGE

## 2024-03-21 RX ORDER — METOPROLOL TARTRATE 50 MG
1 TABLET ORAL
Qty: 0 | Refills: 0 | DISCHARGE

## 2024-03-21 RX ORDER — CILOSTAZOL 100 MG/1
50 TABLET ORAL
Refills: 0 | Status: DISCONTINUED | OUTPATIENT
Start: 2024-03-21 | End: 2024-03-22

## 2024-03-21 RX ORDER — FENOFIBRIC ACID 105 MG/1
0 TABLET ORAL
Qty: 0 | Refills: 0 | DISCHARGE

## 2024-03-21 RX ORDER — BUDESONIDE AND FORMOTEROL FUMARATE DIHYDRATE 160; 4.5 UG/1; UG/1
2 AEROSOL RESPIRATORY (INHALATION) DAILY
Refills: 0 | Status: DISCONTINUED | OUTPATIENT
Start: 2024-03-21 | End: 2024-03-22

## 2024-03-21 RX ORDER — ASPIRIN/CALCIUM CARB/MAGNESIUM 324 MG
81 TABLET ORAL DAILY
Refills: 0 | Status: DISCONTINUED | OUTPATIENT
Start: 2024-03-21 | End: 2024-03-22

## 2024-03-21 RX ORDER — INSULIN LISPRO 100/ML
VIAL (ML) SUBCUTANEOUS AT BEDTIME
Refills: 0 | Status: DISCONTINUED | OUTPATIENT
Start: 2024-03-21 | End: 2024-03-22

## 2024-03-21 RX ORDER — METOPROLOL TARTRATE 50 MG
50 TABLET ORAL
Refills: 0 | Status: DISCONTINUED | OUTPATIENT
Start: 2024-03-21 | End: 2024-03-22

## 2024-03-21 RX ORDER — GLUCAGON INJECTION, SOLUTION 0.5 MG/.1ML
1 INJECTION, SOLUTION SUBCUTANEOUS ONCE
Refills: 0 | Status: DISCONTINUED | OUTPATIENT
Start: 2024-03-21 | End: 2024-03-22

## 2024-03-21 RX ORDER — FENOFIBRATE,MICRONIZED 130 MG
1 CAPSULE ORAL
Refills: 0 | DISCHARGE

## 2024-03-21 RX ORDER — DEXTROSE 50 % IN WATER 50 %
15 SYRINGE (ML) INTRAVENOUS ONCE
Refills: 0 | Status: DISCONTINUED | OUTPATIENT
Start: 2024-03-21 | End: 2024-03-22

## 2024-03-21 RX ORDER — ICOSAPENT ETHYL 500 MG/1
2 CAPSULE, LIQUID FILLED ORAL
Refills: 0 | DISCHARGE

## 2024-03-21 RX ORDER — INSULIN LISPRO 100/ML
VIAL (ML) SUBCUTANEOUS
Refills: 0 | Status: DISCONTINUED | OUTPATIENT
Start: 2024-03-21 | End: 2024-03-22

## 2024-03-21 RX ORDER — CLOPIDOGREL BISULFATE 75 MG/1
1 TABLET, FILM COATED ORAL
Qty: 0 | Refills: 0 | DISCHARGE

## 2024-03-21 RX ORDER — FENOFIBRATE,MICRONIZED 130 MG
145 CAPSULE ORAL DAILY
Refills: 0 | Status: DISCONTINUED | OUTPATIENT
Start: 2024-03-21 | End: 2024-03-22

## 2024-03-21 RX ORDER — DEXTROSE 50 % IN WATER 50 %
12.5 SYRINGE (ML) INTRAVENOUS ONCE
Refills: 0 | Status: DISCONTINUED | OUTPATIENT
Start: 2024-03-21 | End: 2024-03-22

## 2024-03-21 RX ORDER — SODIUM CHLORIDE 9 MG/ML
250 INJECTION INTRAMUSCULAR; INTRAVENOUS; SUBCUTANEOUS ONCE
Refills: 0 | Status: COMPLETED | OUTPATIENT
Start: 2024-03-21 | End: 2024-03-21

## 2024-03-21 RX ADMIN — CILOSTAZOL 50 MILLIGRAM(S): 100 TABLET ORAL at 16:29

## 2024-03-21 RX ADMIN — Medication 50 MILLIGRAM(S): at 16:29

## 2024-03-21 RX ADMIN — SODIUM CHLORIDE 750 MILLILITER(S): 9 INJECTION INTRAMUSCULAR; INTRAVENOUS; SUBCUTANEOUS at 08:28

## 2024-03-21 RX ADMIN — Medication 145 MILLIGRAM(S): at 16:27

## 2024-03-21 RX ADMIN — TAMSULOSIN HYDROCHLORIDE 0.4 MILLIGRAM(S): 0.4 CAPSULE ORAL at 21:00

## 2024-03-21 RX ADMIN — ATORVASTATIN CALCIUM 80 MILLIGRAM(S): 80 TABLET, FILM COATED ORAL at 21:00

## 2024-03-21 RX ADMIN — Medication 2: at 16:22

## 2024-03-21 RX ADMIN — Medication 2: at 12:16

## 2024-03-21 RX ADMIN — LOSARTAN POTASSIUM 100 MILLIGRAM(S): 100 TABLET, FILM COATED ORAL at 16:27

## 2024-03-21 RX ADMIN — TIOTROPIUM BROMIDE 2 PUFF(S): 18 CAPSULE ORAL; RESPIRATORY (INHALATION) at 16:29

## 2024-03-21 RX ADMIN — SODIUM CHLORIDE 75 MILLILITER(S): 9 INJECTION INTRAMUSCULAR; INTRAVENOUS; SUBCUTANEOUS at 08:28

## 2024-03-21 RX ADMIN — BUDESONIDE AND FORMOTEROL FUMARATE DIHYDRATE 2 PUFF(S): 160; 4.5 AEROSOL RESPIRATORY (INHALATION) at 21:55

## 2024-03-21 NOTE — CHART NOTE - NSCHARTNOTEFT_GEN_A_CORE
Removal of Femoral Sheath    Pulses in the left lower extremity are palpable. The patient was placed in the supine position. The insertion site was identified and the sutures were removed per protocol by CHRISTOPHER Zamora.  The 6 [place] femoral sheath was then removed. Direct pressure was applied for  20 minutes.     Monitoring of the left groin and both lower extremities including neuro-vascular checks and vital signs every 15 minutes x 4, then every 30 minutes x 2, then every 1 hour x 2 and 4 hours x 72 hours was ordered.    Complications: None

## 2024-03-21 NOTE — H&P CARDIOLOGY - NSICDXPASTMEDICALHX_GEN_ALL_CORE_FT
PAST MEDICAL HISTORY:  CAD (coronary artery disease)     COPD (chronic obstructive pulmonary disease)     HTN - Hypertension     Hypercholesterolemia     Obese     PVD (Peripheral Vascular Disease)     Right Ankle Fracture

## 2024-03-21 NOTE — PATIENT PROFILE ADULT - FALL HARM RISK - UNIVERSAL INTERVENTIONS
Bed in lowest position, wheels locked, appropriate side rails in place/Call bell, personal items and telephone in reach/Instruct patient to call for assistance before getting out of bed or chair/Non-slip footwear when patient is out of bed/[place] to call system/Physically safe environment - no spills, clutter or unnecessary equipment/Purposeful Proactive Rounding/Room/bathroom lighting operational, light cord in reach

## 2024-03-21 NOTE — DISCHARGE NOTE PROVIDER - NSDCCPCAREPLAN_GEN_ALL_CORE_FT
PRINCIPAL DISCHARGE DIAGNOSIS  Diagnosis: CAD (coronary artery disease)  Assessment and Plan of Treatment: Low salt, low fat diet.   Weight management.   Take medications as prescribed.    No smoking.  Follow up appointments with your doctor(s)  as instructed.      SECONDARY DISCHARGE DIAGNOSES  Diagnosis: HLD (hyperlipidemia)  Assessment and Plan of Treatment: Your LDL cholesterol will be less than 70mg/dL  Continue with your cholesterol medications. Eat a heart healthy diet that is low in saturated fats and salt, and includes whole grains, fruits, vegetables and lean protein; exercise regularly (consult with your physician or cardiologist first); maintain a heart healthy weight; if you smoke - quit (A resource to help you stop smoking is the [facility] – phone number [phone].). Continue to follow with your primary physician or cardiologist.

## 2024-03-21 NOTE — ASU PATIENT PROFILE, ADULT - FALL HARM RISK - UNIVERSAL INTERVENTIONS
Bed in lowest position, wheels locked, appropriate side rails in place/Call bell, personal items and telephone in reach/Instruct patient to call for assistance before getting out of bed or chair/Non-slip footwear when patient is out of bed/[place] to call system/Physically safe environment - no spills, clutter or unnecessary equipment/Purposeful Proactive Rounding/Room/bathroom lighting operational, light cord in reach

## 2024-03-21 NOTE — DISCHARGE NOTE PROVIDER - NSDCMRMEDTOKEN_GEN_ALL_CORE_FT
aspirin 81 mg oral delayed release tablet: 1 tab(s) orally once a day  cilostazol 50 mg oral tablet: 1 tab(s) orally 2 times a day  fenofibrate 134 mg oral capsule: 1 cap(s) orally once a day  Januvia 100 mg oral tablet: 1 tab(s) orally once a day  Lipitor 80 mg oral tablet: 1 tab(s) orally once a day (at bedtime) x 30 days MDD: 1  losartan 100 mg oral tablet: 1 tab(s) orally once a day  metoprolol tartrate 50 mg oral tablet: 1 tab(s) orally 2 times a day  Plavix 75 mg oral tablet: 1 tab(s) orally once a day  tamsulosin 0.4 mg oral capsule: 1 cap(s) orally once a day  tiotropium 2.5 mcg/inh inhalation aerosol: 2 puff(s) inhaled once a day  Vascepa 1 g oral capsule: 2 cap(s) orally 2 times a day

## 2024-03-21 NOTE — DISCHARGE NOTE PROVIDER - CARE PROVIDER_API CALL
Rush Graff  Cardiovascular Disease  [address] [place] [id number]  Phone: [phone]  Fax: [phone]  Follow Up Time: 2 weeks

## 2024-03-21 NOTE — H&P CARDIOLOGY - NSICDXPASTSURGICALHX_GEN_ALL_CORE_FT
PAST SURGICAL HISTORY:  ORIF Right ankle     Right Great saphenous vein bypass trombectomy    S/P CABG (coronary artery bypass graft)     Wound debridement 2010

## 2024-03-21 NOTE — DISCHARGE NOTE PROVIDER - NSDCFUADDINST_GEN_ALL_CORE_FT
Wound Care: The day AFTER your procedure:  Remove bandage GENTLY, and clean using mild soap and gentle warm, water stream, pat dry.   Leave OPEN to air. YOU MAY SHOWER  DO NOT SOAK your procedure site for 1 week (no baths, no pools, no tubs)  Check your wrist or groin puncture site everyday.  A small amount of soreness and bruising is normal  ACTIVITY for the next 24 hours:  1) DO NOT DRIVE  2) DO NOT make any important decisions or sign legal documents  3) DO NOT operate heavy [facility]   You may resume sexual activity in 48 hours, unless otherwise instructed by your cardiologist  If your procedure was done through the WRIST, for the NEXT 3 DAYS:  AVOID pushing pulling  or repeated movement of that hand and wrist (eg: typing, hammering)  DO NOT LIFT anything more than 5 lbs     If your procedure was done through the GROIN, for the NEXT 5 DAYS:  Limit climbing the stairs, no strenuous activities, no pushing, no pulling, no straining  Do not lift anything that is 10lbs or heavier   MEDICATION:  Take your medications as explained (see discharge paperwork). If you recieved a stent, you will be taking medication to KEEP YOUR STENT OPEN. DO NOT STOP THESE MEDICATIONS UNLESS DIRECTED BY A CARDIOLOGIST  If you smoke, WE RECOMMEND YOU QUIT (you may call [phone] the [facility] of Tobacco [facility] if you need assistance)   FOLLOW UP: Please follow up with your private cardiologist (insert name) in 2 weeks.  Please call immediately for an appointment upon discharge from the hospital.    ***CALL YOUR DOCTOR***   If you experience: chest pain, fever, chills, body aches, or severe pain, swelling, redness, heat or yellow discharge at incision site or if you experience bleeding, temperature change, numbness or excruciating pain at the procedural site  If you are unable to reach your doctor, you may contact:    Cardiology Office at [facility] at [phone]   Cardiac Short Stay Unit (CSSU) [phone]    Cardiac Recovery Suite (CRS) [phone]  Wound Care: The day AFTER your procedure:  Remove bandage GENTLY, and clean using mild soap and gentle warm, water stream, pat dry  Leave OPEN to air. YOU MAY SHOWER  DO NOT SOAK your procedure site for 1 week (no baths, no pools, no tubs)  Check your wrist or groin puncture site everyday.  A small amount of soreness and bruising is normal  ACTIVITY for the next 24 hours:  1) DO NOT DRIVE  2) DO NOT make any important decisions or sign legal documents  3) DO NOT operate heavy [facility]   You may resume sexual activity in 48 hours, unless otherwise instructed by your cardiologist  If your procedure was done through the WRIST, for the NEXT 3 DAYS:  AVOID pushing pulling  or repeated movement of that hand and wrist (eg: typing, hammering)  DO NOT LIFT anything more than 5 lbs     If your procedure was done through the GROIN, for the NEXT 5 DAYS:  Limit climbing the stairs, no strenuous activities, no pushing, no pulling, no straining  Do not lift anything that is 10lbs or heavier   MEDICATION:  Take your medications as explained (see discharge paperwork). If you recieved a stent, you will be taking medication to KEEP YOUR STENT OPEN. DO NOT STOP THESE MEDICATIONS UNLESS DIRECTED BY A CARDIOLOGIST  If you smoke, WE RECOMMEND YOU QUIT (you may call [phone] the [facility] of Tobacco [facility] if you need assistance)   FOLLOW UP: Please follow up with your private cardiologist (insert name) in 2 weeks.  Please call immediately for an appointment upon discharge from the hospital.    ***CALL YOUR DOCTOR***   If you experience: chest pain, fever, chills, body aches, or severe pain, swelling, redness, heat or yellow discharge at incision site or if you experience bleeding, temperature change, numbness or excruciating pain at the procedural site  If you are unable to reach your doctor, you may contact:    Cardiology Office at [facility] at [phone]   Cardiac Short Stay Unit (CSSU) [phone]    Cardiac Recovery Suite (CRS) [phone]

## 2024-03-21 NOTE — H&P CARDIOLOGY - NSICDXFAMILYHX_GEN_ALL_CORE_FT
FAMILY HISTORY:  Father  Still living? Unknown  Family history of heart disease, Age at diagnosis: Age Unknown  Family history of hypertension, Age at diagnosis: Age Unknown    Mother  Still living? Unknown  Family history of hypertension, Age at diagnosis: Age Unknown

## 2024-03-21 NOTE — H&P CARDIOLOGY - HISTORY OF PRESENT ILLNESS
85 y/o male with, former cigarette smoker x 60PY with PMHx of COPD and R upper lobe ground glass opacity (under observation with yearly CT chest and has been stable to date), HTN, HLD, PVD and CAD s/p CABG in 2016 who underwent Pharm PET Myocardial perfusion imagining on 3/6 which demonstrated moderate hypokinesis of the mide to apical inferoseptal wall, moderate hypokinesis of the mid anteroseptal wall, and a medium reversible perfusion abnormality of mild to moderate intensity located iin the apical septal, mid anteroseptal and mid inferoseptal segments consistent with ischemia of LAD.  Patient is now for [facility].      Pili Graff

## 2024-03-21 NOTE — DISCHARGE NOTE PROVIDER - HOSPITAL COURSE
HPI: 83 y/o male with, former cigarette smoker x 60PY with PMHx of COPD and R upper lobe ground glass opacity (under observation with yearly CT chest and has been stable to date), HTN, HLD, PVD and CAD s/p CABG in 2016 who underwent Pharm PET Myocardial perfusion imagining on 3/6 which demonstrated moderate hypokinesis of the mide to apical inferoseptal wall, moderate hypokinesis of the mid anteroseptal wall, and a medium reversible perfusion abnormality of mild to moderate intensity located iin the apical septal, mid anteroseptal and mid inferoseptal segments consistent with ischemia of LAD.  Patient is now for [facility].    [facility]. [facility]     3/21 s/p COURT x1 to OM1 via LFA, site without hematoma/bleeding. Patient without complaint, hemodynamically stable. Pending discharge in AM

## 2024-03-22 VITALS
DIASTOLIC BLOOD PRESSURE: 64 MMHG | TEMPERATURE: 98 F | RESPIRATION RATE: 17 BRPM | OXYGEN SATURATION: 96 % | SYSTOLIC BLOOD PRESSURE: 122 MMHG | HEART RATE: 88 BPM

## 2024-03-22 PROCEDURE — 80048 BASIC METABOLIC PNL TOTAL CA: CPT

## 2024-03-22 PROCEDURE — 93455 CORONARY ART/GRFT ANGIO S&I: CPT | Mod: 59

## 2024-03-22 PROCEDURE — 82962 GLUCOSE BLOOD TEST: CPT

## 2024-03-22 PROCEDURE — C1769: CPT

## 2024-03-22 PROCEDURE — C1887: CPT

## 2024-03-22 PROCEDURE — C1725: CPT

## 2024-03-22 PROCEDURE — C1894: CPT

## 2024-03-22 PROCEDURE — C9600: CPT | Mod: LC

## 2024-03-22 PROCEDURE — 93005 ELECTROCARDIOGRAM TRACING: CPT

## 2024-03-22 PROCEDURE — 84132 ASSAY OF SERUM POTASSIUM: CPT

## 2024-03-22 PROCEDURE — 36415 COLL VENOUS BLD VENIPUNCTURE: CPT

## 2024-03-22 PROCEDURE — 85027 COMPLETE CBC AUTOMATED: CPT

## 2024-03-22 PROCEDURE — 94640 AIRWAY INHALATION TREATMENT: CPT

## 2024-03-22 PROCEDURE — 99232 SBSQ HOSP IP/OBS MODERATE 35: CPT

## 2024-03-22 PROCEDURE — C1874: CPT

## 2024-03-22 RX ADMIN — CLOPIDOGREL BISULFATE 75 MILLIGRAM(S): 75 TABLET, FILM COATED ORAL at 05:20

## 2024-03-22 RX ADMIN — Medication 81 MILLIGRAM(S): at 05:20

## 2024-03-22 RX ADMIN — CILOSTAZOL 50 MILLIGRAM(S): 100 TABLET ORAL at 05:20

## 2024-03-22 RX ADMIN — Medication 50 MILLIGRAM(S): at 05:20

## 2024-03-22 NOTE — PROGRESS NOTE ADULT - ASSESSMENT
HPI: 83 y/o male with, former cigarette smoker x 60PY with PMHx of COPD and R upper lobe ground glass opacity (under observation with yearly CT chest and has been stable to date), HTN, HLD, PVD and CAD s/p CABG in 2016 who underwent Pharm PET Myocardial perfusion imagining on 3/6 which demonstrated moderate hypokinesis of the mide to apical inferoseptal wall, moderate hypokinesis of the mid anteroseptal wall, and a medium reversible perfusion abnormality of mild to moderate intensity located iin the apical septal, mid anteroseptal and mid inferoseptal segments consistent with ischemia of LAD.  Patient is now for [facility].    David. Claudia Graff     # CAD  3/21 s/p COURT x1 to SVG-OM1 via LFA  Left groin w/o bleeding or hematoma, soft, non tender.  Pulses in the left lower extremity are palpable  Denies chest pain, denies groin/leg/foot: pain, numbness or tingling   Cont DAPT- Aspirin 81 mg daily, Plavix 75 mg daily, Pletal 50 mg BID  Continue Losartan 100 mg daily, Metoprolol Tartrate 50 mg BID  Continue statin- Atorvastatin 80 mg daily  Monitor telemetry  Keep Mg >2 K >4  Follow up appt in 2 weeks post dc with oupt cardiologist     # HLD  Continue Atorvastatin 80 mg daily    # HTN  Continue Losartan 100 mg daily, Metoprolol Tartrate 50 mg BID  Normotensive  DASH diet    - Reviewed and reinforced with patient:  wound care instructions, activities dos and donts, medication compliance specifically antiplatelet therapy given stent/s.    - Patient aware to take DAPT  as prescribed and DO NOT STOP taking without consulting cardiologist first or STENT/s WILL CLOSE  - Reviewed and reinforced with patient:  site complications ( eg: bleeding, excruciating pain at the procedural site, large sweliing-golf ball size-  extremity numbness, tingling, temperature change), or CHEST PAIN; pt aware that if any of those occur he/she must call cardiologist IMMEDIATELY or 911 or go to nearest emergency room   - Reviewed and reinforced a heart healthy diet, Smoking Cessation  - Patient verbalizes understanding of ALL OF THE ABOVE, and gives positive feedback     Zachary Osorio [facility]  Invasive Cardiology  Ext [phone]

## 2024-03-22 NOTE — PROGRESS NOTE ADULT - SUBJECTIVE AND OBJECTIVE BOX
[facility] INVASIVE CARDIOLOGY- (Regi, Bridget, Luca, Olivia, Shira, Migdalia, Rahat, Maury, Cheng)   CARDIAC CATH LAB, [facility] TEAM   [phone]    CHIEF COMPLAINT: Patient is a 84y old male who presents with a chief complaint of abnormal stress test    HPI: 85 y/o male with, former cigarette smoker x 60PY with PMHx of COPD and R upper lobe ground glass opacity (under observation with yearly CT chest and has been stable to date), HTN, HLD, PVD and CAD s/p CABG in 2016 who underwent Pharm PET Myocardial perfusion imagining on 3/6 which demonstrated moderate hypokinesis of the mide to apical inferoseptal wall, moderate hypokinesis of the mid anteroseptal wall, and a medium reversible perfusion abnormality of mild to moderate intensity located iin the apical septal, mid anteroseptal and mid inferoseptal segments consistent with ischemia of LAD.  Patient is now for [facility].    Pili Graff    Subjective/Observations: patient seen and examined.  denies chest pain, dyspena, dizziness, palpitations, N&V, HA    Review of Systems all WNL except below indicated:    Constitutional: [ ] Fever [ ] Chills [ ] Fatigue [ ] Weight change   HEENT: [ ] Blurred vision [ ] Eye Pain [ ] Headache [ ] Runny nose [ ] Sore Throat   Respiratory: [ ] Cough [ ] Wheezing [ ] Shortness of breath  Cardiovascular: [ ] Chest Pain [ ] Palpitations [ ] ODOM [ ] PND [ ] Orthopnea  Gastrointestinal: [ ] Abdominal Pain [ ] Diarrhea [ ] Constipation [ ] Hemorrhoids [ ] Nausea [ ] Vomiting  Genitourinary: [ ] Nocturia [ ] Dysuria [ ] Incontinence  Extremities: [ ] Swelling [ ] Joint Pain  Neurologic: [ ] Focal deficit [ ] Paresthesias [ ] Syncope  Lymphatic: [ ] Swelling [ ] Lymphadenopathy   Skin: [ ] Rash [ ] Ecchymoses [ ] Wounds [ ] Lesions  Psychiatry: [ ] Depression [ ] Suicidal/Homicidal Ideation [ ] Anxiety [ ] Sleep Disturbances  [ ] 10 point review of systems is otherwise negative except as mentioned above            [ ]Unable to obtain    PAST MEDICAL & SURGICAL HISTORY:  HTN - Hypertension    Hypercholesterolemia    Right Ankle Fracture    Obese    PVD (Peripheral Vascular Disease)    COPD (chronic obstructive pulmonary disease)    CAD (coronary artery disease)    ORIF Right ankle    Right Great saphenous vein bypass  trombectomy    Wound debridement 2010    S/P CABG (coronary artery bypass graft)      MEDICATIONS  (STANDING):  aspirin enteric coated 81 milliGRAM(s) Oral daily  atorvastatin 80 milliGRAM(s) Oral at bedtime  cilostazol 50 milliGRAM(s) Oral two times a day  clopidogrel Tablet 75 milliGRAM(s) Oral daily  fenofibrate Tablet 145 milliGRAM(s) Oral daily  glucagon  Injectable 1 milliGRAM(s) IntraMuscular once  insulin lispro (ADMELOG) corrective regimen sliding scale   SubCutaneous three times a day before meals  insulin lispro (ADMELOG) corrective regimen sliding scale   SubCutaneous at bedtime  losartan 100 milliGRAM(s) Oral daily  metoprolol tartrate 50 milliGRAM(s) Oral two times a day  sodium chloride 0.9%. 1000 milliLiter(s) (75 mL/Hr) IV Continuous <Continuous>  tamsulosin 0.4 milliGRAM(s) Oral at bedtime  tiotropium 2.5 MICROgram(s) Inhaler 2 Puff(s) Inhalation daily    MEDICATIONS  (PRN):  dextrose Oral Gel 15 Gram(s) Oral once PRN Blood Glucose LESS THAN 70 milliGRAM(s)/deciliter    Allergies    No Known Allergies      Vital Signs Last 24 Hrs  T(C): 36.6 (21 Mar 2024 17:00), Max: 36.9 (21 Mar 2024 08:01)  T(F): 97.9 (21 Mar 2024 17:00), Max: 98.4 (21 Mar 2024 08:01)  HR: 88 (21 Mar 2024 17:00) (85 - 97)  BP: 167/72 (21 Mar 2024 17:00) (120/67 - 178/91)  BP(mean): 104 (21 Mar 2024 17:00) (88 - 120)  RR: 16 (21 Mar 2024 17:00) (16 - 18)  SpO2: 94% (21 Mar 2024 17:00) (92% - 98%)    Parameters below as of 21 Mar 2024 17:00  Patient On (Oxygen Delivery Method): room air    I&O's Summary    Weight (kg): 108.4 (03-21 @ 08:08)    FOCUSED PHYSICAL EXAM:  Pulmonary: Non-labored, breath sounds are clear bilaterally, No wheezing, rales or rhonchi  Cardiovascular: Regular, S1 and S2, No murmurs, rubs, gallops or clicks  cath site: Left groin stable w/o bleeding or hematoma, soft, + pulses     LABS: All Labs Reviewed:                        15.8   7.49  )-----------( 174      ( 21 Mar 2024 08:36 )             47.0     21 Mar 2024 09:34    x      |  x      |  x      ----------------------------<  x      4.6     |  x      |  x      21 Mar 2024 08:36    132    |  98     |  27     ----------------------------<  183    See Note   |  19     |  1.19     Ca    9.9        21 Mar 2024 08:36      RESULTS:    ECG:  Ventricular Rate 97 BPM    Atrial Rate 97 BPM    P-R Interval 196 ms    QRS Duration 138 ms    Q-T Interval 370 ms    QTC Calculation(Bazett) 469 ms    P Axis 70 degrees    R Axis 261 degrees    T Axis 49 degrees    Diagnosis Line NORMAL SINUS RHYTHM  RIGHT BUNDLE BRANCH BLOCK  ABNORMAL ECG  WHEN COMPARED WITH ECG OF 04-NOV-2017 19:45,  NO SIGNIFICANT CHANGE WAS FOUND    CATH REPORT:  Study Date:     03/21/2024   Cath Lab Report    Diagnostic Cardiologist:       Adryan Segovia MD   Referring Physician:           uRsh Graff MD     Procedures Performed   1.    Arterial Access - Right Femoral   2.    Diagnostic Coronary Angiography   3.    PCI: COURT     Conclusions:   1. Occluded dLCx and pLAD   2. Patent SVG-OM and LIMA-LAD   3. Severe atherosclerosis of the OM1 after the SVG touchdown s/p PCI  with COURT  Recommendations:   Continue DAPT for at least 6 months.

## 2024-03-22 NOTE — DISCHARGE NOTE NURSING/CASE MANAGEMENT/SOCIAL WORK - PATIENT PORTAL LINK FT
You can access the FollowMyHealth Patient Portal offered by [facility] by registering at the following website: [email or web address]. By joining [facility]’s FollowMyHealth portal, you will also be able to view your health information using other applications (apps) compatible with our system.

## 2024-03-27 RX ORDER — SITAGLIPTIN 100 MG/1
100 TABLET, FILM COATED ORAL DAILY
Qty: 90 | Refills: 1 | Status: ACTIVE | COMMUNITY
Start: 2023-10-24 | End: 1900-01-01

## 2024-04-25 PROBLEM — I25.10 ATHEROSCLEROTIC HEART DISEASE OF NATIVE CORONARY ARTERY WITHOUT ANGINA PECTORIS: Chronic | Status: ACTIVE | Noted: 2024-03-21

## 2024-05-01 ENCOUNTER — APPOINTMENT (OUTPATIENT)
Dept: ENDOCRINOLOGY | Facility: CLINIC | Age: 84
End: 2024-05-01
Payer: MEDICARE

## 2024-05-01 VITALS
DIASTOLIC BLOOD PRESSURE: 80 MMHG | BODY MASS INDEX: 33.36 KG/M2 | HEART RATE: 75 BPM | HEIGHT: 70 IN | OXYGEN SATURATION: 97 % | TEMPERATURE: 98.5 F | WEIGHT: 233.06 LBS | SYSTOLIC BLOOD PRESSURE: 111 MMHG

## 2024-05-01 DIAGNOSIS — I10 ESSENTIAL (PRIMARY) HYPERTENSION: ICD-10-CM

## 2024-05-01 DIAGNOSIS — E66.9 OBESITY, UNSPECIFIED: ICD-10-CM

## 2024-05-01 DIAGNOSIS — E78.00 PURE HYPERCHOLESTEROLEMIA, UNSPECIFIED: ICD-10-CM

## 2024-05-01 LAB
GLUCOSE BLDC GLUCOMTR-MCNC: 257
HBA1C MFR BLD HPLC: 7.5

## 2024-05-01 PROCEDURE — G2211 COMPLEX E/M VISIT ADD ON: CPT

## 2024-05-01 PROCEDURE — 99214 OFFICE O/P EST MOD 30 MIN: CPT

## 2024-05-01 PROCEDURE — 36415 COLL VENOUS BLD VENIPUNCTURE: CPT

## 2024-05-01 PROCEDURE — 83036 HEMOGLOBIN GLYCOSYLATED A1C: CPT | Mod: QW

## 2024-05-01 PROCEDURE — 82962 GLUCOSE BLOOD TEST: CPT

## 2024-05-01 NOTE — HISTORY OF PRESENT ILLNESS
[FreeTextEntry1] : Mr. ALEXIA MASON is an 84-year-old male who returns with regard to a hx of type 2 dm. The diabetes has been present for about 6 years. He denies any history of retinopathy or nephropathy. With regard to neuropathy, he reports stabbing sensation in feet b/l and bothersome with talking. He did take vitamins in the past as per podiatry but it did not help.   For the diabetes, he is currently taking Januvia 100 mg daily.  - Off Metformin due to diarrhea.   He denies polyuria, polydipsia, or any visual changes. He also denies any skin lesions, skin breakdown or non-healing areas of skin. He denies any podiatric concerns. Ophthalmologic evaluation is up to date- no diabetic changes noted. He is s/p cataract b/l surgery about 2 months ago.    Home glucose monitoring has shown values to be running above 220 to 250s in the morning. Has trouble sleeping at night. Has nocturia then stays up eating in the middle of the night.  He does deny any hypoglycemia or hypoglycemic signs or symptoms of late.  POCT A1C returned today at 7.5%, previously 7% in October 2023.  POCT glucose today at 257 mg/dL. Had eaten about 1 hour prior.   ____________________________________________ Recently s/p one stent placement last month. Cardiology has recommended starting the patient on Repatha or Ozempic.   Additional medical history includes that of AAA, CAD s/p CABG x3 about 7-8 years ago, COPD, HLD, HTN, obesity, pulmonary nodules  Additional Medications: atorvastatin 80, fenofibrate 145 mg, losartan 100 mg, metoprolol 50 mg, cilostazol 50 mg, Plavix, Vascepa 2 capsules, tamsulosin,  PCP/ Cardio Dr. Marcum

## 2024-05-01 NOTE — ADDENDUM
[FreeTextEntry1] : POCT glucose testing and Hemoglobin A1c was carried out today given diabetes diagnosis. Blood will be drawn in office today.  This note was written by Lisbet Miller on 05/01/2024 acting as medical scribe for Dr. Adryan Olson. I, Dr. Adryan Olson, have read and attest that all the information, medical decision making and discharge instructions within are true and accurate.

## 2024-05-02 DIAGNOSIS — E11.9 TYPE 2 DIABETES MELLITUS W/OUT COMPLICATIONS: ICD-10-CM

## 2024-05-02 LAB
25(OH)D3 SERPL-MCNC: 15.5 NG/ML
ALBUMIN SERPL ELPH-MCNC: 4.9 G/DL
ALP BLD-CCNC: 38 U/L
ALT SERPL-CCNC: 59 U/L
ANION GAP SERPL CALC-SCNC: 16 MMOL/L
AST SERPL-CCNC: 29 U/L
BASOPHILS # BLD AUTO: 0.07 K/UL
BASOPHILS NFR BLD AUTO: 0.8 %
BILIRUB SERPL-MCNC: 0.7 MG/DL
BUN SERPL-MCNC: 29 MG/DL
CALCIUM SERPL-MCNC: 9.7 MG/DL
CHLORIDE SERPL-SCNC: 100 MMOL/L
CHOLEST SERPL-MCNC: 170 MG/DL
CO2 SERPL-SCNC: 22 MMOL/L
CREAT SERPL-MCNC: 1.57 MG/DL
CREAT SPEC-SCNC: 132 MG/DL
EGFR: 44 ML/MIN/1.73M2
EOSINOPHIL # BLD AUTO: 0.24 K/UL
EOSINOPHIL NFR BLD AUTO: 2.6 %
ESTIMATED AVERAGE GLUCOSE: 169 MG/DL
FRUCTOSAMINE SERPL-MCNC: 326 UMOL/L
GLUCOSE SERPL-MCNC: 221 MG/DL
GLYCOMARK.: 1.2 UG/ML
HBA1C MFR BLD HPLC: 7.5 %
HCT VFR BLD CALC: 49.2 %
HDLC SERPL-MCNC: 29 MG/DL
HGB BLD-MCNC: 15.3 G/DL
IMM GRANULOCYTES NFR BLD AUTO: 0.4 %
LDLC SERPL DIRECT ASSAY-MCNC: 84 MG/DL
LYMPHOCYTES # BLD AUTO: 2.21 K/UL
LYMPHOCYTES NFR BLD AUTO: 24.2 %
MAGNESIUM SERPL-MCNC: 2.2 MG/DL
MAN DIFF?: NORMAL
MCHC RBC-ENTMCNC: 28.5 PG
MCHC RBC-ENTMCNC: 31.1 GM/DL
MCV RBC AUTO: 91.6 FL
MICROALBUMIN 24H UR DL<=1MG/L-MCNC: 4 MG/DL
MICROALBUMIN/CREAT 24H UR-RTO: 30 MG/G
MONOCYTES # BLD AUTO: 0.7 K/UL
MONOCYTES NFR BLD AUTO: 7.7 %
NEUTROPHILS # BLD AUTO: 5.89 K/UL
NEUTROPHILS NFR BLD AUTO: 64.3 %
PLATELET # BLD AUTO: 201 K/UL
POTASSIUM SERPL-SCNC: 5.3 MMOL/L
PROT SERPL-MCNC: 7.4 G/DL
RBC # BLD: 5.37 M/UL
RBC # FLD: 15.7 %
SODIUM SERPL-SCNC: 138 MMOL/L
T3FREE SERPL-MCNC: 2.3 PG/ML
T4 FREE SERPL-MCNC: 1.1 NG/DL
TRIGL SERPL-MCNC: 402 MG/DL
TSH SERPL-ACNC: 2.46 UIU/ML
WBC # FLD AUTO: 9.15 K/UL

## 2024-05-02 RX ORDER — SEMAGLUTIDE 0.68 MG/ML
2 INJECTION, SOLUTION SUBCUTANEOUS
Qty: 3 | Refills: 1 | Status: ACTIVE | COMMUNITY
Start: 2024-05-02 | End: 1900-01-01

## 2024-05-07 ENCOUNTER — APPOINTMENT (OUTPATIENT)
Dept: ENDOCRINOLOGY | Facility: CLINIC | Age: 84
End: 2024-05-07

## 2024-05-15 RX ORDER — LANCETS 33 GAUGE
EACH MISCELLANEOUS
Qty: 1 | Refills: 3 | Status: ACTIVE | COMMUNITY
Start: 2024-05-15 | End: 1900-01-01

## 2024-05-15 RX ORDER — BLOOD SUGAR DIAGNOSTIC
STRIP MISCELLANEOUS DAILY
Qty: 1 | Refills: 3 | Status: ACTIVE | COMMUNITY
Start: 2024-05-15 | End: 1900-01-01

## 2024-05-29 RX ORDER — ALBUTEROL SULFATE 90 UG/1
108 (90 BASE) INHALANT RESPIRATORY (INHALATION)
Qty: 1 | Refills: 1 | Status: ACTIVE | COMMUNITY
Start: 2023-09-06 | End: 1900-01-01

## 2024-06-26 RX ORDER — TIOTROPIUM BROMIDE INHALATION SPRAY 3.12 UG/1
2.5 SPRAY, METERED RESPIRATORY (INHALATION) DAILY
Qty: 3 | Refills: 3 | Status: ACTIVE | COMMUNITY
Start: 2021-01-06 | End: 1900-01-01

## 2024-07-17 ENCOUNTER — APPOINTMENT (OUTPATIENT)
Dept: PULMONOLOGY | Facility: CLINIC | Age: 84
End: 2024-07-17
Payer: MEDICARE

## 2024-07-17 VITALS
DIASTOLIC BLOOD PRESSURE: 85 MMHG | HEART RATE: 105 BPM | BODY MASS INDEX: 32.93 KG/M2 | TEMPERATURE: 97.6 F | HEIGHT: 70 IN | SYSTOLIC BLOOD PRESSURE: 142 MMHG | WEIGHT: 230 LBS | OXYGEN SATURATION: 96 %

## 2024-07-17 DIAGNOSIS — R91.8 OTHER NONSPECIFIC ABNORMAL FINDING OF LUNG FIELD: ICD-10-CM

## 2024-07-17 DIAGNOSIS — R93.89 ABNORMAL FINDINGS ON DIAGNOSTIC IMAGING OF OTHER SPECIFIED BODY STRUCTURES: ICD-10-CM

## 2024-07-17 DIAGNOSIS — Z77.090 CONTACT WITH AND (SUSPECTED) EXPOSURE TO ASBESTOS: ICD-10-CM

## 2024-07-17 DIAGNOSIS — R06.02 SHORTNESS OF BREATH: ICD-10-CM

## 2024-07-17 DIAGNOSIS — E66.9 OBESITY, UNSPECIFIED: ICD-10-CM

## 2024-07-17 PROCEDURE — 94060 EVALUATION OF WHEEZING: CPT

## 2024-07-17 PROCEDURE — 99214 OFFICE O/P EST MOD 30 MIN: CPT | Mod: 25

## 2024-07-17 PROCEDURE — 95012 NITRIC OXIDE EXP GAS DETER: CPT

## 2024-08-22 ENCOUNTER — APPOINTMENT (OUTPATIENT)
Dept: ENDOCRINOLOGY | Facility: CLINIC | Age: 84
End: 2024-08-22

## 2024-08-28 ENCOUNTER — NON-APPOINTMENT (OUTPATIENT)
Age: 84
End: 2024-08-28

## 2024-08-28 ENCOUNTER — APPOINTMENT (OUTPATIENT)
Dept: ENDOCRINOLOGY | Facility: CLINIC | Age: 84
End: 2024-08-28
Payer: MEDICARE

## 2024-08-28 ENCOUNTER — APPOINTMENT (OUTPATIENT)
Dept: CARDIOLOGY | Facility: CLINIC | Age: 84
End: 2024-08-28

## 2024-08-28 VITALS
DIASTOLIC BLOOD PRESSURE: 70 MMHG | HEART RATE: 95 BPM | WEIGHT: 231.06 LBS | OXYGEN SATURATION: 98 % | HEIGHT: 70 IN | BODY MASS INDEX: 33.08 KG/M2 | SYSTOLIC BLOOD PRESSURE: 120 MMHG | TEMPERATURE: 98.1 F

## 2024-08-28 DIAGNOSIS — J44.9 CHRONIC OBSTRUCTIVE PULMONARY DISEASE, UNSPECIFIED: ICD-10-CM

## 2024-08-28 DIAGNOSIS — I10 ESSENTIAL (PRIMARY) HYPERTENSION: ICD-10-CM

## 2024-08-28 DIAGNOSIS — I70.8 ATHEROSCLEROSIS OF AORTA: ICD-10-CM

## 2024-08-28 DIAGNOSIS — I77.811 ABDOMINAL AORTIC ECTASIA: ICD-10-CM

## 2024-08-28 DIAGNOSIS — I65.23 OCCLUSION AND STENOSIS OF BILATERAL CAROTID ARTERIES: ICD-10-CM

## 2024-08-28 DIAGNOSIS — I70.0 ATHEROSCLEROSIS OF AORTA: ICD-10-CM

## 2024-08-28 DIAGNOSIS — E11.9 TYPE 2 DIABETES MELLITUS W/OUT COMPLICATIONS: ICD-10-CM

## 2024-08-28 DIAGNOSIS — E11.40 TYPE 2 DIABETES MELLITUS WITH DIABETIC NEUROPATHY, UNSPECIFIED: ICD-10-CM

## 2024-08-28 DIAGNOSIS — I25.10 ATHEROSCLEROTIC HEART DISEASE OF NATIVE CORONARY ARTERY W/OUT ANGINA PECTORIS: ICD-10-CM

## 2024-08-28 DIAGNOSIS — E66.9 OBESITY, UNSPECIFIED: ICD-10-CM

## 2024-08-28 LAB
GLUCOSE BLDC GLUCOMTR-MCNC: 256
HBA1C MFR BLD HPLC: 7.3

## 2024-08-28 PROCEDURE — G2211 COMPLEX E/M VISIT ADD ON: CPT

## 2024-08-28 PROCEDURE — 36415 COLL VENOUS BLD VENIPUNCTURE: CPT

## 2024-08-28 PROCEDURE — 83036 HEMOGLOBIN GLYCOSYLATED A1C: CPT | Mod: QW

## 2024-08-28 PROCEDURE — 99214 OFFICE O/P EST MOD 30 MIN: CPT

## 2024-08-28 PROCEDURE — 82962 GLUCOSE BLOOD TEST: CPT

## 2024-08-29 RX ORDER — BLOOD-GLUCOSE METER
W/DEVICE EACH MISCELLANEOUS
Qty: 1 | Refills: 0 | Status: ACTIVE | COMMUNITY
Start: 2024-08-29 | End: 1900-01-01

## 2024-08-29 RX ORDER — GLIMEPIRIDE 1 MG/1
1 TABLET ORAL
Qty: 45 | Refills: 1 | Status: ACTIVE | COMMUNITY
Start: 2024-08-29 | End: 1900-01-01

## 2024-08-30 DIAGNOSIS — E78.00 PURE HYPERCHOLESTEROLEMIA, UNSPECIFIED: ICD-10-CM

## 2024-08-30 DIAGNOSIS — E55.9 VITAMIN D DEFICIENCY, UNSPECIFIED: ICD-10-CM

## 2024-08-30 LAB
25(OH)D3 SERPL-MCNC: 14 NG/ML
ALBUMIN SERPL ELPH-MCNC: 4.6 G/DL
ALP BLD-CCNC: 39 U/L
ALT SERPL-CCNC: 43 U/L
ANION GAP SERPL CALC-SCNC: 17 MMOL/L
APO B SERPL-MCNC: 97 MG/DL
AST SERPL-CCNC: 24 U/L
BILIRUB SERPL-MCNC: 0.6 MG/DL
BUN SERPL-MCNC: 31 MG/DL
CALCIUM SERPL-MCNC: 10.2 MG/DL
CHLORIDE SERPL-SCNC: 98 MMOL/L
CHOLEST SERPL-MCNC: 153 MG/DL
CO2 SERPL-SCNC: 22 MMOL/L
CREAT SERPL-MCNC: 1.48 MG/DL
CREAT SPEC-SCNC: 87 MG/DL
EGFR: 47 ML/MIN/1.73M2
ESTIMATED AVERAGE GLUCOSE: 169 MG/DL
FRUCTOSAMINE SERPL-MCNC: 321 UMOL/L
GLUCOSE SERPL-MCNC: 199 MG/DL
GLYCOMARK.: 1.2 UG/ML
HBA1C MFR BLD HPLC: 7.5 %
HDLC SERPL-MCNC: 24 MG/DL
LDLC SERPL CALC-MCNC: 57 MG/DL
LDLC SERPL DIRECT ASSAY-MCNC: 66 MG/DL
MAGNESIUM SERPL-MCNC: 2.3 MG/DL
MICROALBUMIN 24H UR DL<=1MG/L-MCNC: 3.5 MG/DL
MICROALBUMIN/CREAT 24H UR-RTO: 41 MG/G
NONHDLC SERPL-MCNC: 129 MG/DL
POTASSIUM SERPL-SCNC: 5.3 MMOL/L
PROT SERPL-MCNC: 7.3 G/DL
SODIUM SERPL-SCNC: 136 MMOL/L
T3FREE SERPL-MCNC: 2.48 PG/ML
T4 FREE SERPL-MCNC: 1 NG/DL
TRIGL SERPL-MCNC: 476 MG/DL
TSH SERPL-ACNC: 3.02 UIU/ML

## 2024-08-30 RX ORDER — EZETIMIBE 10 MG/1
10 TABLET ORAL DAILY
Qty: 90 | Refills: 1 | Status: ACTIVE | COMMUNITY
Start: 2024-08-30 | End: 1900-01-01

## 2024-08-30 RX ORDER — ERGOCALCIFEROL 1.25 MG/1
1.25 MG CAPSULE ORAL
Qty: 12 | Refills: 1 | Status: ACTIVE | COMMUNITY
Start: 2024-08-30 | End: 1900-01-01

## 2024-08-31 NOTE — HISTORY OF PRESENT ILLNESS
[FreeTextEntry1] : This is an 83 y/o male with a pmhx of CAD s/p CABG, DM, HLD, HTN here today to establish cardiac care.

## 2024-08-31 NOTE — REASON FOR VISIT
[FreeTextEntry1] : Here to establish care no chills/no diaphoresis/no dizziness/no shortness of breath

## 2024-09-04 NOTE — HISTORY OF PRESENT ILLNESS
[FreeTextEntry1] : Mr. ALEXIA MASON is an 84-year-old male who returns with regard to a hx of type 2 dm. The diabetes has been present for about 7 years. He denies any history of retinopathy or nephropathy. With regard to neuropathy, he reports numbness/tingling/burning in his b/l LE.   For the diabetes, he is currently taking Januvia 50mg daily.  - Off Metformin for last 7 months  - Could not tolerate Ozempic due to constipation, lightheaded, fatigue after 5 doses however constipation is now much better with the use of Metamucil   He has reported diarrhea for last 6 months - 1 year prior thought it was related to fruit intake. He had been on metformin for last 6 years. Diarrhea has improved since stopping metformin about 7 months ago.   Has one touch verio. Home glucose monitoring has shown values of late to be -250s, and sometimes in 300s.  He does deny any significant hypoglycemic signs and symptoms of late.  Diet: admits to eating a lot of fruit, but not a lot at once.   POCT A1C returned today at 7.3%, previously 7.5% in May 2024.  POCT glucose today at 256 mg/dL.  He denies polyuria, polydipsia, or any visual changes. He also denies any skin lesions, skin breakdown or non-healing areas of skin. He denies any podiatric concerns; follows with podiatry. Ophthalmologic evaluation is up to date- no diabetic changes noted.  Labs from May 2024  cr 1.57  gfr 44  ____________________________________________________________________________________________________  Additional medical history includes that of AAA, CAD s/p CABG x 3 about 8-9 years ago, COPD, HLD, HTN, obesity, pulmonary nodules - Past smoker for 28 years, quit 29 years ago.   Additional Medications: atorvastatin 80, fenofibrate 145 mg, losartan 100 mg, metoprolol 50 mg, Cilostazol 50 mg, ASA 81mg, Plavix, Vascepa 2 capsules BiD, tamsulosin - off Vitamin D 50,000 iu.  PCP/ Cardio Dr. Marcum

## 2024-09-04 NOTE — ADDENDUM
[FreeTextEntry1] : POCT glucose testing and Hemoglobin A1c was carried out today given diabetes diagnosis.  blood will be drawn in the office today  This note was written by Lisbet Miller on 08/28/2024 acting as medical scribe for Dr. Adryan Olson. I, Dr. Adryan Olson, have read and attest that all the information, medical decision making and discharge instructions within are true and accurate.

## 2025-01-22 ENCOUNTER — APPOINTMENT (OUTPATIENT)
Dept: PULMONOLOGY | Facility: CLINIC | Age: 85
End: 2025-01-22
Payer: MEDICARE

## 2025-01-22 VITALS
OXYGEN SATURATION: 94 % | HEIGHT: 70 IN | HEART RATE: 67 BPM | DIASTOLIC BLOOD PRESSURE: 66 MMHG | SYSTOLIC BLOOD PRESSURE: 122 MMHG | WEIGHT: 224 LBS | TEMPERATURE: 97.6 F | BODY MASS INDEX: 32.07 KG/M2

## 2025-01-22 DIAGNOSIS — R93.89 ABNORMAL FINDINGS ON DIAGNOSTIC IMAGING OF OTHER SPECIFIED BODY STRUCTURES: ICD-10-CM

## 2025-01-22 DIAGNOSIS — R91.8 OTHER NONSPECIFIC ABNORMAL FINDING OF LUNG FIELD: ICD-10-CM

## 2025-01-22 DIAGNOSIS — J44.9 CHRONIC OBSTRUCTIVE PULMONARY DISEASE, UNSPECIFIED: ICD-10-CM

## 2025-01-22 LAB — HEMOGLOBIN: 11.4

## 2025-01-22 PROCEDURE — 85018 HEMOGLOBIN: CPT | Mod: QW

## 2025-01-22 PROCEDURE — 94060 EVALUATION OF WHEEZING: CPT

## 2025-01-22 PROCEDURE — ZZZZZ: CPT

## 2025-01-22 PROCEDURE — 94727 GAS DIL/WSHOT DETER LNG VOL: CPT

## 2025-01-22 PROCEDURE — 94618 PULMONARY STRESS TESTING: CPT

## 2025-01-22 PROCEDURE — 94729 DIFFUSING CAPACITY: CPT

## 2025-01-22 PROCEDURE — 99214 OFFICE O/P EST MOD 30 MIN: CPT | Mod: 25

## 2025-01-27 NOTE — H&P CARDIOLOGY - CARDIOVASCULAR COMMENTS
Medication: olmesartan  Last office visit date: 1/3/25  Medication Refill Protocol Failed.  Failed criteria:  eGFR resulted within last 12 months, Failed Potassium resulted within last 12 months is within 10% of normal range looking at last value, Sodium resulted within last 12 months is within 10% of normal range looking at last value . Sent to clinician to review.     Computed eGFR Cre unavailable. One or more values for this score either were not found within the given timeframe or did not fit some other criterion.     Potassium (mmol/L)   Date Value   09/04/2022 4.5     Sodium (mmol/L)   Date Value   09/04/2022 139     Please advise   see hpi

## 2025-01-29 ENCOUNTER — APPOINTMENT (OUTPATIENT)
Dept: ENDOCRINOLOGY | Facility: CLINIC | Age: 85
End: 2025-01-29
Payer: MEDICARE

## 2025-01-29 VITALS
SYSTOLIC BLOOD PRESSURE: 140 MMHG | OXYGEN SATURATION: 95 % | BODY MASS INDEX: 32.5 KG/M2 | TEMPERATURE: 97 F | HEART RATE: 75 BPM | WEIGHT: 227 LBS | HEIGHT: 70 IN | DIASTOLIC BLOOD PRESSURE: 70 MMHG

## 2025-01-29 VITALS — DIASTOLIC BLOOD PRESSURE: 80 MMHG | SYSTOLIC BLOOD PRESSURE: 125 MMHG

## 2025-01-29 DIAGNOSIS — E11.40 TYPE 2 DIABETES MELLITUS WITH DIABETIC NEUROPATHY, UNSPECIFIED: ICD-10-CM

## 2025-01-29 DIAGNOSIS — E78.00 PURE HYPERCHOLESTEROLEMIA, UNSPECIFIED: ICD-10-CM

## 2025-01-29 DIAGNOSIS — I10 ESSENTIAL (PRIMARY) HYPERTENSION: ICD-10-CM

## 2025-01-29 DIAGNOSIS — E55.9 VITAMIN D DEFICIENCY, UNSPECIFIED: ICD-10-CM

## 2025-01-29 DIAGNOSIS — E66.9 OBESITY, UNSPECIFIED: ICD-10-CM

## 2025-01-29 DIAGNOSIS — E11.9 TYPE 2 DIABETES MELLITUS W/OUT COMPLICATIONS: ICD-10-CM

## 2025-01-29 LAB
GLUCOSE BLDC GLUCOMTR-MCNC: 295
HBA1C MFR BLD HPLC: 7.8

## 2025-01-29 PROCEDURE — G2211 COMPLEX E/M VISIT ADD ON: CPT

## 2025-01-29 PROCEDURE — 36415 COLL VENOUS BLD VENIPUNCTURE: CPT

## 2025-01-29 PROCEDURE — 83036 HEMOGLOBIN GLYCOSYLATED A1C: CPT | Mod: QW

## 2025-01-29 PROCEDURE — 82962 GLUCOSE BLOOD TEST: CPT

## 2025-01-29 PROCEDURE — 99214 OFFICE O/P EST MOD 30 MIN: CPT

## 2025-01-30 LAB
25(OH)D3 SERPL-MCNC: 36.4 NG/ML
ALBUMIN SERPL ELPH-MCNC: 4.5 G/DL
ALP BLD-CCNC: 45 U/L
ALT SERPL-CCNC: 50 U/L
ANION GAP SERPL CALC-SCNC: 18 MMOL/L
AST SERPL-CCNC: 24 U/L
BASOPHILS # BLD AUTO: 0.08 K/UL
BASOPHILS NFR BLD AUTO: 0.9 %
BILIRUB SERPL-MCNC: 0.6 MG/DL
BUN SERPL-MCNC: 32 MG/DL
CALCIUM SERPL-MCNC: 10.5 MG/DL
CHLORIDE SERPL-SCNC: 100 MMOL/L
CHOLEST SERPL-MCNC: 159 MG/DL
CO2 SERPL-SCNC: 22 MMOL/L
CREAT SERPL-MCNC: 1.36 MG/DL
CREAT SPEC-SCNC: 82 MG/DL
EGFR: 52 ML/MIN/1.73M2
EOSINOPHIL # BLD AUTO: 0.19 K/UL
EOSINOPHIL NFR BLD AUTO: 2.2 %
ESTIMATED AVERAGE GLUCOSE: 180 MG/DL
FRUCTOSAMINE SERPL-MCNC: 308 UMOL/L
GLUCOSE SERPL-MCNC: 236 MG/DL
GLYCOMARK.: 1.5 UG/ML
HBA1C MFR BLD HPLC: 7.9 %
HCT VFR BLD CALC: 46.8 %
HDLC SERPL-MCNC: 28 MG/DL
HGB BLD-MCNC: 15.5 G/DL
IMM GRANULOCYTES NFR BLD AUTO: 0.5 %
LDLC SERPL DIRECT ASSAY-MCNC: 67 MG/DL
LYMPHOCYTES # BLD AUTO: 2.52 K/UL
LYMPHOCYTES NFR BLD AUTO: 28.7 %
MAGNESIUM SERPL-MCNC: 2.4 MG/DL
MAN DIFF?: NORMAL
MCHC RBC-ENTMCNC: 28.4 PG
MCHC RBC-ENTMCNC: 33.1 G/DL
MCV RBC AUTO: 85.9 FL
MICROALBUMIN 24H UR DL<=1MG/L-MCNC: 3.4 MG/DL
MICROALBUMIN/CREAT 24H UR-RTO: 42 MG/G
MONOCYTES # BLD AUTO: 0.61 K/UL
MONOCYTES NFR BLD AUTO: 6.9 %
NEUTROPHILS # BLD AUTO: 5.35 K/UL
NEUTROPHILS NFR BLD AUTO: 60.8 %
PLATELET # BLD AUTO: 202 K/UL
POTASSIUM SERPL-SCNC: 5.3 MMOL/L
PROT SERPL-MCNC: 7.5 G/DL
RBC # BLD: 5.45 M/UL
RBC # FLD: 15.3 %
SODIUM SERPL-SCNC: 139 MMOL/L
T3FREE SERPL-MCNC: 2.48 PG/ML
T4 FREE SERPL-MCNC: 1 NG/DL
TRIGL SERPL-MCNC: 440 MG/DL
TSH SERPL-ACNC: 3.35 UIU/ML
VIT B12 SERPL-MCNC: 391 PG/ML
WBC # FLD AUTO: 8.79 K/UL

## 2025-02-06 LAB — UBIQUINONE10 SERPL-MCNC: 0.91 UG/ML

## 2025-06-18 ENCOUNTER — APPOINTMENT (OUTPATIENT)
Dept: ENDOCRINOLOGY | Facility: CLINIC | Age: 85
End: 2025-06-18

## 2025-07-02 ENCOUNTER — APPOINTMENT (OUTPATIENT)
Dept: PULMONOLOGY | Facility: CLINIC | Age: 85
End: 2025-07-02
Payer: MEDICARE

## 2025-07-02 VITALS — WEIGHT: 229 LBS | BODY MASS INDEX: 32.86 KG/M2

## 2025-07-02 VITALS
OXYGEN SATURATION: 95 % | HEIGHT: 70 IN | SYSTOLIC BLOOD PRESSURE: 134 MMHG | DIASTOLIC BLOOD PRESSURE: 61 MMHG | HEART RATE: 89 BPM | TEMPERATURE: 98 F

## 2025-07-02 PROBLEM — J44.1 COPD EXACERBATION: Status: ACTIVE | Noted: 2025-07-02

## 2025-07-02 PROCEDURE — 71046 X-RAY EXAM CHEST 2 VIEWS: CPT

## 2025-07-02 PROCEDURE — 94618 PULMONARY STRESS TESTING: CPT

## 2025-07-02 PROCEDURE — 94060 EVALUATION OF WHEEZING: CPT

## 2025-07-02 PROCEDURE — 99214 OFFICE O/P EST MOD 30 MIN: CPT | Mod: 25

## 2025-07-02 RX ORDER — PREDNISONE 10 MG/1
10 TABLET ORAL
Qty: 12 | Refills: 0 | Status: ACTIVE | COMMUNITY
Start: 2025-07-02 | End: 1900-01-01

## 2025-07-29 ENCOUNTER — APPOINTMENT (OUTPATIENT)
Dept: CT IMAGING | Facility: CLINIC | Age: 85
End: 2025-07-29
Payer: MEDICARE

## 2025-07-29 PROCEDURE — 71250 CT THORAX DX C-: CPT

## 2025-08-04 ENCOUNTER — NON-APPOINTMENT (OUTPATIENT)
Age: 85
End: 2025-08-04

## 2025-08-13 ENCOUNTER — APPOINTMENT (OUTPATIENT)
Dept: UROLOGY | Facility: CLINIC | Age: 85
End: 2025-08-13
Payer: MEDICARE

## 2025-08-13 VITALS
TEMPERATURE: 96.7 F | OXYGEN SATURATION: 96 % | HEART RATE: 73 BPM | SYSTOLIC BLOOD PRESSURE: 151 MMHG | DIASTOLIC BLOOD PRESSURE: 78 MMHG

## 2025-08-13 DIAGNOSIS — N13.8 BENIGN PROSTATIC HYPERPLASIA WITH LOWER URINARY TRACT SYMPMS: ICD-10-CM

## 2025-08-13 DIAGNOSIS — N40.1 BENIGN PROSTATIC HYPERPLASIA WITH LOWER URINARY TRACT SYMPMS: ICD-10-CM

## 2025-08-13 DIAGNOSIS — R39.15 URGENCY OF URINATION: ICD-10-CM

## 2025-08-13 DIAGNOSIS — R35.0 FREQUENCY OF MICTURITION: ICD-10-CM

## 2025-08-13 PROCEDURE — G2211 COMPLEX E/M VISIT ADD ON: CPT

## 2025-08-13 PROCEDURE — 99204 OFFICE O/P NEW MOD 45 MIN: CPT

## 2025-08-14 LAB
ANION GAP SERPL CALC-SCNC: 16 MMOL/L
APPEARANCE: CLEAR
BACTERIA: NEGATIVE /HPF
BILIRUBIN URINE: NEGATIVE
BLOOD URINE: NEGATIVE
BUN SERPL-MCNC: 29 MG/DL
CALCIUM SERPL-MCNC: 9.6 MG/DL
CAST: 0 /LPF
CHLORIDE SERPL-SCNC: 97 MMOL/L
CO2 SERPL-SCNC: 21 MMOL/L
COLOR: YELLOW
CREAT SERPL-MCNC: 1.54 MG/DL
EGFRCR SERPLBLD CKD-EPI 2021: 44 ML/MIN/1.73M2
EPITHELIAL CELLS: 0 /HPF
ESTIMATED AVERAGE GLUCOSE: 246 MG/DL
GLUCOSE QUALITATIVE U: >=1000 MG/DL
GLUCOSE SERPL-MCNC: 335 MG/DL
HBA1C MFR BLD HPLC: 10.2 %
KETONES URINE: NEGATIVE MG/DL
LEUKOCYTE ESTERASE URINE: NEGATIVE
MICROSCOPIC-UA: NORMAL
NITRITE URINE: NEGATIVE
PH URINE: 5.5
POTASSIUM SERPL-SCNC: 4.8 MMOL/L
PROTEIN URINE: NEGATIVE MG/DL
RED BLOOD CELLS URINE: 0 /HPF
SODIUM SERPL-SCNC: 134 MMOL/L
SPECIFIC GRAVITY URINE: 1.03
UROBILINOGEN URINE: 0.2 MG/DL
WHITE BLOOD CELLS URINE: 0 /HPF

## 2025-08-15 LAB — BACTERIA UR CULT: NORMAL

## 2025-09-09 ENCOUNTER — APPOINTMENT (OUTPATIENT)
Dept: VASCULAR SURGERY | Facility: CLINIC | Age: 85
End: 2025-09-09

## 2025-09-09 ENCOUNTER — APPOINTMENT (OUTPATIENT)
Dept: VASCULAR SURGERY | Facility: CLINIC | Age: 85
End: 2025-09-09
Payer: MEDICARE

## 2025-09-09 VITALS
BODY MASS INDEX: 34.22 KG/M2 | HEART RATE: 68 BPM | TEMPERATURE: 97.9 F | DIASTOLIC BLOOD PRESSURE: 84 MMHG | HEIGHT: 70 IN | SYSTOLIC BLOOD PRESSURE: 158 MMHG | WEIGHT: 239 LBS

## 2025-09-09 DIAGNOSIS — I77.1 STRICTURE OF ARTERY: ICD-10-CM

## 2025-09-09 DIAGNOSIS — L98.499 STRICTURE OF ARTERY: ICD-10-CM

## 2025-09-09 DIAGNOSIS — I72.4 ANEURYSM OF ARTERY OF LOWER EXTREMITY: ICD-10-CM

## 2025-09-09 PROCEDURE — ZZZZZ: CPT

## 2025-09-09 PROCEDURE — 99214 OFFICE O/P EST MOD 30 MIN: CPT

## 2025-09-09 PROCEDURE — 93923 UPR/LXTR ART STDY 3+ LVLS: CPT

## 2025-09-09 PROCEDURE — 93926 LOWER EXTREMITY STUDY: CPT | Mod: RT

## 2025-09-09 RX ORDER — CILOSTAZOL 100 MG/1
100 TABLET ORAL TWICE DAILY
Qty: 60 | Refills: 6 | Status: ACTIVE | COMMUNITY
Start: 2025-09-09 | End: 1900-01-01

## 2025-09-09 RX ORDER — AMOXICILLIN AND CLAVULANATE POTASSIUM 500; 125 MG/1; MG/1
500-125 TABLET, FILM COATED ORAL
Qty: 20 | Refills: 0 | Status: ACTIVE | COMMUNITY
Start: 2025-09-09 | End: 1900-01-01